# Patient Record
Sex: FEMALE | Race: ASIAN | NOT HISPANIC OR LATINO
[De-identification: names, ages, dates, MRNs, and addresses within clinical notes are randomized per-mention and may not be internally consistent; named-entity substitution may affect disease eponyms.]

---

## 2021-06-29 ENCOUNTER — NON-APPOINTMENT (OUTPATIENT)
Age: 66
End: 2021-06-29

## 2021-07-07 ENCOUNTER — APPOINTMENT (OUTPATIENT)
Dept: ORTHOPEDIC SURGERY | Facility: CLINIC | Age: 66
End: 2021-07-07
Payer: MEDICAID

## 2021-07-07 VITALS
HEART RATE: 68 BPM | BODY MASS INDEX: 29.07 KG/M2 | SYSTOLIC BLOOD PRESSURE: 129 MMHG | OXYGEN SATURATION: 98 % | WEIGHT: 154 LBS | HEIGHT: 61 IN | DIASTOLIC BLOOD PRESSURE: 82 MMHG

## 2021-07-07 DIAGNOSIS — Z78.9 OTHER SPECIFIED HEALTH STATUS: ICD-10-CM

## 2021-07-07 DIAGNOSIS — Z87.39 PERSONAL HISTORY OF OTHER DISEASES OF THE MUSCULOSKELETAL SYSTEM AND CONNECTIVE TISSUE: ICD-10-CM

## 2021-07-07 DIAGNOSIS — Z86.79 PERSONAL HISTORY OF OTHER DISEASES OF THE CIRCULATORY SYSTEM: ICD-10-CM

## 2021-07-07 DIAGNOSIS — Z86.39 PERSONAL HISTORY OF OTHER ENDOCRINE, NUTRITIONAL AND METABOLIC DISEASE: ICD-10-CM

## 2021-07-07 PROBLEM — Z00.00 ENCOUNTER FOR PREVENTIVE HEALTH EXAMINATION: Status: ACTIVE | Noted: 2021-07-07

## 2021-07-07 PROCEDURE — 72070 X-RAY EXAM THORAC SPINE 2VWS: CPT

## 2021-07-07 PROCEDURE — 99203 OFFICE O/P NEW LOW 30 MIN: CPT

## 2021-07-07 PROCEDURE — 72110 X-RAY EXAM L-2 SPINE 4/>VWS: CPT

## 2021-07-07 RX ORDER — ATORVASTATIN CALCIUM 10 MG/1
10 TABLET, FILM COATED ORAL
Refills: 0 | Status: ACTIVE | COMMUNITY

## 2021-07-07 NOTE — ASSESSMENT
[FreeTextEntry1] : I had a lengthy discussion with the patient in regards to their treatment plan and diagnosis.  They do have objective weakness findings on my exam.  Their symptoms have persisted despite the conservative management they have attempted thus far.  As a result I would like to proceed with a lumbar MRI.  In tandem with this they should begin physical therapy/home therapy program.  The patient can take Tylenol/NSAIDs as needed for pain control if medically able to.  I will have the patient follow-up in 3 to 4 weeks for repeat clinical evaluation.  I encouraged them to follow-up sooner if their symptoms worsen or change in any way.

## 2021-07-07 NOTE — PHYSICAL EXAM
[de-identified] : Lumbar Physical Exam\par \par Gait - Normal\par \par Station - Normal\par \par Sagittal balance - Normal\par \par Compensatory mechanism? - None\par \par Heel walk - Normal\par \par Toe walk - Normal\par \par Reflexes\par Patellar - normal\par Gastroc - normal\par Clonus - No\par \par Hip Exam - Normal\par \par Straight leg raise - none\par \par Pulses - 2+ dp/pt\par \par Range of motion - normal\par \par Sensation \par Sensation intact to light touch in L1, L2, L3, L4, L5 and S1 dermatomes bilaterally\par \par Motor\par 	IP	Quad	HS	TA	Gastroc	EHL\par Right	5/5	5/5	5/5	5/5	5/5	5/5\par Left	4/5	4/5	5/5	5/5	5/5	5/5 [de-identified] : Lumbar radiographs\par Degenerative scoliosis noted\par No obvious instability on flexion-extension radiographs\par Listhesis noted on lateral film\par \par Thoracic radiographs\par Thoracic spondylosis noted\par Kyphosis within normal limits

## 2021-07-07 NOTE — HISTORY OF PRESENT ILLNESS
[de-identified] : This is a 65-year-old female here today for evaluation of her back and lower extremity symptoms.  She has been dealing with this for the past year.  Over the past several months it has acutely worsened.  She has pain that radiates down her left lateral thigh.  She also has some right-sided leg pain but the left side is worse than her right.  She has tried extensive conservative management including NSAIDs and physical therapy but unfortunately her symptoms have continued.  She denies any bowel bladder issues.  She denies any saddle anesthesia.

## 2021-07-24 ENCOUNTER — APPOINTMENT (OUTPATIENT)
Dept: MRI IMAGING | Facility: CLINIC | Age: 66
End: 2021-07-24
Payer: MEDICAID

## 2021-07-24 ENCOUNTER — OUTPATIENT (OUTPATIENT)
Dept: OUTPATIENT SERVICES | Facility: HOSPITAL | Age: 66
LOS: 1 days | End: 2021-07-24
Payer: MEDICAID

## 2021-07-24 DIAGNOSIS — M54.5 LOW BACK PAIN: ICD-10-CM

## 2021-07-24 PROCEDURE — 72148 MRI LUMBAR SPINE W/O DYE: CPT | Mod: 26

## 2021-07-24 PROCEDURE — 72148 MRI LUMBAR SPINE W/O DYE: CPT

## 2021-07-28 ENCOUNTER — APPOINTMENT (OUTPATIENT)
Dept: ORTHOPEDIC SURGERY | Facility: CLINIC | Age: 66
End: 2021-07-28

## 2021-08-07 ENCOUNTER — TRANSCRIPTION ENCOUNTER (OUTPATIENT)
Age: 66
End: 2021-08-07

## 2021-08-16 ENCOUNTER — APPOINTMENT (OUTPATIENT)
Dept: ORTHOPEDIC SURGERY | Facility: CLINIC | Age: 66
End: 2021-08-16

## 2021-09-20 ENCOUNTER — APPOINTMENT (OUTPATIENT)
Dept: ORTHOPEDIC SURGERY | Facility: CLINIC | Age: 66
End: 2021-09-20
Payer: MEDICAID

## 2021-09-20 VITALS
DIASTOLIC BLOOD PRESSURE: 82 MMHG | SYSTOLIC BLOOD PRESSURE: 130 MMHG | WEIGHT: 154 LBS | BODY MASS INDEX: 29.07 KG/M2 | HEART RATE: 64 BPM | HEIGHT: 61 IN | OXYGEN SATURATION: 97 %

## 2021-09-20 DIAGNOSIS — M54.5 LOW BACK PAIN: ICD-10-CM

## 2021-09-20 PROCEDURE — 99214 OFFICE O/P EST MOD 30 MIN: CPT

## 2021-09-20 NOTE — HISTORY OF PRESENT ILLNESS
[de-identified] : Today the patient states she is doing a little better. She has some hip pain and some knee pain along with low back pain. She is taking meloxicam and tizanidine with relief. She is also doing her physical therapy which is helping her. She is able to walk 3 blocks before she has to sit down and rest. She denies any saddle anesthesia. She denies any bowel/bladder issues. \par \par \par 07/07/21\par This is a 65-year-old female here today for evaluation of her back and lower extremity symptoms.  She has been dealing with this for the past year.  Over the past several months it has acutely worsened.  She has pain that radiates down her left lateral thigh.  She also has some right-sided leg pain but the left side is worse than her right.  She has tried extensive conservative management including NSAIDs and physical therapy but unfortunately her symptoms have continued.  She denies any bowel bladder issues.  She denies any saddle anesthesia.

## 2021-09-20 NOTE — PHYSICAL EXAM
[de-identified] : Lumbar Physical Exam\par \par Gait - Normal\par \par Station - Normal\par \par Sagittal balance - Normal\par \par Compensatory mechanism? - None\par \par Heel walk - Normal\par \par Toe walk - Normal\par \par Reflexes\par Patellar - normal\par Gastroc - normal\par Clonus - No\par \par Hip Exam - Normal\par \par Straight leg raise - none\par \par Pulses - 2+ dp/pt\par \par Range of motion - normal\par \par Sensation \par Sensation intact to light touch in L1, L2, L3, L4, L5 and S1 dermatomes bilaterally\par \par Motor\par 	IP	Quad	HS	TA	Gastroc	EHL\par Right	5/5	5/5	5/5	5/5	5/5	5/5\par Left	5/5	5/5	5/5	5/5	5/5	5/5 [de-identified] : Lumbar radiographs\par Degenerative scoliosis noted\par No obvious instability on flexion-extension radiographs\par Listhesis noted on lateral film\par \par Thoracic radiographs\par Thoracic spondylosis noted\par Kyphosis within normal limits\par \par Lumbar MRI\par There is multilevel foraminal stenosis, worse at L2-L3\par Significant facet arthrosis noted\par \par

## 2021-09-20 NOTE — ASSESSMENT
[FreeTextEntry1] : And lengthy discussion with the patient regards to her treatment plan and diagnosis.  At this point we will continue with conservative management.  This will include pain management referral.  An order for this is been placed today.  I would also like her to continue with physical therapy.  She can take Tylenol ibuprofen as needed for pain relief.  I will have her follow-up in 8 weeks for repeat clinical evaluation.  I encouraged her to follow-up sooner if she has any worsening symptoms.

## 2021-11-22 ENCOUNTER — APPOINTMENT (OUTPATIENT)
Dept: ORTHOPEDIC SURGERY | Facility: CLINIC | Age: 66
End: 2021-11-22

## 2022-01-28 ENCOUNTER — INPATIENT (INPATIENT)
Facility: HOSPITAL | Age: 67
LOS: 18 days | Discharge: HOME | End: 2022-02-16
Attending: PSYCHIATRY & NEUROLOGY | Admitting: PSYCHIATRY & NEUROLOGY
Payer: MEDICAID

## 2022-01-28 VITALS
HEART RATE: 112 BPM | OXYGEN SATURATION: 100 % | DIASTOLIC BLOOD PRESSURE: 79 MMHG | SYSTOLIC BLOOD PRESSURE: 152 MMHG | RESPIRATION RATE: 18 BRPM | TEMPERATURE: 98 F

## 2022-01-28 LAB — TOXICOLOGY SCREEN, DRUGS OF ABUSE, SERUM RESULT: SIGNIFICANT CHANGE UP

## 2022-01-28 PROCEDURE — 99285 EMERGENCY DEPT VISIT HI MDM: CPT | Mod: 25

## 2022-01-28 PROCEDURE — 93010 ELECTROCARDIOGRAM REPORT: CPT

## 2022-01-28 RX ORDER — ACETAMINOPHEN 500 MG
650 TABLET ORAL ONCE
Refills: 0 | Status: COMPLETED | OUTPATIENT
Start: 2022-01-28 | End: 2022-01-28

## 2022-01-28 NOTE — ED ADULT NURSE NOTE - CHIEF COMPLAINT QUOTE
ams    as per daughter, kylee ([phone]), pt has had ams for several weeks.. becomes agitated only at night, fighting with family members, having visual hallucinations.. seeing dead family members.. thinks they are stealing from her.  hx of htn and hi chol.  pt aa&ox3.. became irritated when asked the date.  calm at this time.

## 2022-01-28 NOTE — ED ADULT TRIAGE NOTE - ADDITIONAL SAFETY/BANDS...
Spoke with pt. Notified pt she is not due for annual until 6/20/18. Notified pt that our providers do not go to the [facility] at this time. Scheduled pt for annual with repeat pap smear at the [facility] location with Dr. MINE Mills 6/20/18 at 7:30. Pt verbalized understanding.    Additional Safety/Bands:

## 2022-01-28 NOTE — ED ADULT TRIAGE NOTE - CHIEF COMPLAINT QUOTE
ams    as per daughter, kylee ([phone]), pt has had ams for several weeks.. becomes agitated only at night, fighting with family members, having visual hallucinations.. seeing dead family members.. thinks they are stealing from her.  hx of htn and hi chol.  pt aa&ox3.. became irritated when asked the date.  calm at this time.
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
## 2022-01-29 DIAGNOSIS — R46.89 OTHER SYMPTOMS AND SIGNS INVOLVING APPEARANCE AND BEHAVIOR: ICD-10-CM

## 2022-01-29 DIAGNOSIS — F29 UNSPECIFIED PSYCHOSIS NOT DUE TO A SUBSTANCE OR KNOWN PHYSIOLOGICAL CONDITION: ICD-10-CM

## 2022-01-29 LAB
ALBUMIN SERPL ELPH-MCNC: 4.3 G/DL — SIGNIFICANT CHANGE UP (ref 3.3–5)
ALP SERPL-CCNC: 107 U/L — SIGNIFICANT CHANGE UP (ref 40–120)
ALT FLD-CCNC: 17 U/L — SIGNIFICANT CHANGE UP (ref 4–33)
AMPHET UR-MCNC: NEGATIVE — SIGNIFICANT CHANGE UP
ANION GAP SERPL CALC-SCNC: 10 MMOL/L — SIGNIFICANT CHANGE UP (ref 7–14)
APAP SERPL-MCNC: <10 UG/ML — LOW (ref 15–25)
APPEARANCE UR: CLEAR — SIGNIFICANT CHANGE UP
AST SERPL-CCNC: 24 U/L — SIGNIFICANT CHANGE UP (ref 4–32)
BACTERIA # UR AUTO: NEGATIVE — SIGNIFICANT CHANGE UP
BARBITURATES UR SCN-MCNC: NEGATIVE — SIGNIFICANT CHANGE UP
BASOPHILS # BLD AUTO: 0.04 K/UL — SIGNIFICANT CHANGE UP (ref 0–0.2)
BASOPHILS NFR BLD AUTO: 0.5 % — SIGNIFICANT CHANGE UP (ref 0–2)
BENZODIAZ UR-MCNC: NEGATIVE — SIGNIFICANT CHANGE UP
BILIRUB SERPL-MCNC: 0.3 MG/DL — SIGNIFICANT CHANGE UP (ref 0.2–1.2)
BILIRUB UR-MCNC: NEGATIVE — SIGNIFICANT CHANGE UP
BUN SERPL-MCNC: 12 MG/DL — SIGNIFICANT CHANGE UP (ref 7–23)
CALCIUM SERPL-MCNC: 10.1 MG/DL — SIGNIFICANT CHANGE UP (ref 8.4–10.5)
CHLORIDE SERPL-SCNC: 103 MMOL/L — SIGNIFICANT CHANGE UP (ref 98–107)
CO2 SERPL-SCNC: 27 MMOL/L — SIGNIFICANT CHANGE UP (ref 22–31)
COCAINE METAB.OTHER UR-MCNC: NEGATIVE — SIGNIFICANT CHANGE UP
COLOR SPEC: COLORLESS — SIGNIFICANT CHANGE UP
CREAT SERPL-MCNC: 1.05 MG/DL — SIGNIFICANT CHANGE UP (ref 0.5–1.3)
CREATININE URINE RESULT, DAU: 20 MG/DL — SIGNIFICANT CHANGE UP
DIFF PNL FLD: NEGATIVE — SIGNIFICANT CHANGE UP
EOSINOPHIL # BLD AUTO: 0.09 K/UL — SIGNIFICANT CHANGE UP (ref 0–0.5)
EOSINOPHIL NFR BLD AUTO: 1.2 % — SIGNIFICANT CHANGE UP (ref 0–6)
EPI CELLS # UR: 1 /HPF — SIGNIFICANT CHANGE UP (ref 0–5)
ETHANOL SERPL-MCNC: <10 MG/DL — SIGNIFICANT CHANGE UP
GLUCOSE SERPL-MCNC: 138 MG/DL — HIGH (ref 70–99)
GLUCOSE UR QL: NEGATIVE — SIGNIFICANT CHANGE UP
HCT VFR BLD CALC: 38.8 % — SIGNIFICANT CHANGE UP (ref 34.5–45)
HGB BLD-MCNC: 12.8 G/DL — SIGNIFICANT CHANGE UP (ref 11.5–15.5)
IANC: 5.2 K/UL — SIGNIFICANT CHANGE UP (ref 1.5–8.5)
IMM GRANULOCYTES NFR BLD AUTO: 0.3 % — SIGNIFICANT CHANGE UP (ref 0–1.5)
KETONES UR-MCNC: NEGATIVE — SIGNIFICANT CHANGE UP
LEUKOCYTE ESTERASE UR-ACNC: NEGATIVE — SIGNIFICANT CHANGE UP
LYMPHOCYTES # BLD AUTO: 1.76 K/UL — SIGNIFICANT CHANGE UP (ref 1–3.3)
LYMPHOCYTES # BLD AUTO: 23.2 % — SIGNIFICANT CHANGE UP (ref 13–44)
MCHC RBC-ENTMCNC: 28.6 PG — SIGNIFICANT CHANGE UP (ref 27–34)
MCHC RBC-ENTMCNC: 33 GM/DL — SIGNIFICANT CHANGE UP (ref 32–36)
MCV RBC AUTO: 86.8 FL — SIGNIFICANT CHANGE UP (ref 80–100)
METHADONE UR-MCNC: NEGATIVE — SIGNIFICANT CHANGE UP
MONOCYTES # BLD AUTO: 0.46 K/UL — SIGNIFICANT CHANGE UP (ref 0–0.9)
MONOCYTES NFR BLD AUTO: 6.1 % — SIGNIFICANT CHANGE UP (ref 2–14)
NEUTROPHILS # BLD AUTO: 5.2 K/UL — SIGNIFICANT CHANGE UP (ref 1.8–7.4)
NEUTROPHILS NFR BLD AUTO: 68.7 % — SIGNIFICANT CHANGE UP (ref 43–77)
NITRITE UR-MCNC: NEGATIVE — SIGNIFICANT CHANGE UP
NRBC # BLD: 0 /100 WBCS — SIGNIFICANT CHANGE UP
NRBC # FLD: 0 K/UL — SIGNIFICANT CHANGE UP
OPIATES UR-MCNC: NEGATIVE — SIGNIFICANT CHANGE UP
OXYCODONE UR-MCNC: NEGATIVE — SIGNIFICANT CHANGE UP
PCP SPEC-MCNC: SIGNIFICANT CHANGE UP
PCP UR-MCNC: NEGATIVE — SIGNIFICANT CHANGE UP
PH UR: 6.5 — SIGNIFICANT CHANGE UP (ref 5–8)
PLATELET # BLD AUTO: 223 K/UL — SIGNIFICANT CHANGE UP (ref 150–400)
POTASSIUM SERPL-MCNC: 3.3 MMOL/L — LOW (ref 3.5–5.3)
POTASSIUM SERPL-SCNC: 3.3 MMOL/L — LOW (ref 3.5–5.3)
PROT SERPL-MCNC: 7.2 G/DL — SIGNIFICANT CHANGE UP (ref 6–8.3)
PROT UR-MCNC: NEGATIVE — SIGNIFICANT CHANGE UP
RBC # BLD: 4.47 M/UL — SIGNIFICANT CHANGE UP (ref 3.8–5.2)
RBC # FLD: 12.6 % — SIGNIFICANT CHANGE UP (ref 10.3–14.5)
RBC CASTS # UR COMP ASSIST: 1 /HPF — SIGNIFICANT CHANGE UP (ref 0–4)
SALICYLATES SERPL-MCNC: <0.3 MG/DL — LOW (ref 15–30)
SARS-COV-2 RNA SPEC QL NAA+PROBE: SIGNIFICANT CHANGE UP
SODIUM SERPL-SCNC: 140 MMOL/L — SIGNIFICANT CHANGE UP (ref 135–145)
SP GR SPEC: 1 — SIGNIFICANT CHANGE UP (ref 1–1.05)
THC UR QL: NEGATIVE — SIGNIFICANT CHANGE UP
TSH SERPL-MCNC: 4.56 UIU/ML — HIGH (ref 0.27–4.2)
UROBILINOGEN FLD QL: SIGNIFICANT CHANGE UP
WBC # BLD: 7.57 K/UL — SIGNIFICANT CHANGE UP (ref 3.8–10.5)
WBC # FLD AUTO: 7.57 K/UL — SIGNIFICANT CHANGE UP (ref 3.8–10.5)
WBC UR QL: 1 /HPF — SIGNIFICANT CHANGE UP (ref 0–5)

## 2022-01-29 PROCEDURE — 70450 CT HEAD/BRAIN W/O DYE: CPT | Mod: 26,MA

## 2022-01-29 PROCEDURE — 71046 X-RAY EXAM CHEST 2 VIEWS: CPT | Mod: 26

## 2022-01-29 PROCEDURE — 99222 1ST HOSP IP/OBS MODERATE 55: CPT

## 2022-01-29 RX ORDER — RISPERIDONE 4 MG/1
0.5 TABLET ORAL AT BEDTIME
Refills: 0 | Status: DISCONTINUED | OUTPATIENT
Start: 2022-01-29 | End: 2022-02-02

## 2022-01-29 RX ORDER — LISINOPRIL 2.5 MG/1
5 TABLET ORAL AT BEDTIME
Refills: 0 | Status: DISCONTINUED | OUTPATIENT
Start: 2022-01-29 | End: 2022-01-29

## 2022-01-29 RX ORDER — ATORVASTATIN CALCIUM 80 MG/1
10 TABLET, FILM COATED ORAL AT BEDTIME
Refills: 0 | Status: DISCONTINUED | OUTPATIENT
Start: 2022-01-29 | End: 2022-02-16

## 2022-01-29 RX ORDER — LISINOPRIL 2.5 MG/1
5 TABLET ORAL AT BEDTIME
Refills: 0 | Status: DISCONTINUED | OUTPATIENT
Start: 2022-01-29 | End: 2022-02-07

## 2022-01-29 RX ORDER — HALOPERIDOL DECANOATE 100 MG/ML
2.5 INJECTION INTRAMUSCULAR EVERY 6 HOURS
Refills: 0 | Status: DISCONTINUED | OUTPATIENT
Start: 2022-01-29 | End: 2022-02-16

## 2022-01-29 RX ORDER — HALOPERIDOL DECANOATE 100 MG/ML
2.5 INJECTION INTRAMUSCULAR ONCE
Refills: 0 | Status: DISCONTINUED | OUTPATIENT
Start: 2022-01-29 | End: 2022-02-16

## 2022-01-29 RX ORDER — ACETAMINOPHEN 500 MG
650 TABLET ORAL EVERY 6 HOURS
Refills: 0 | Status: DISCONTINUED | OUTPATIENT
Start: 2022-01-29 | End: 2022-02-16

## 2022-01-29 RX ORDER — LISINOPRIL 2.5 MG/1
5 TABLET ORAL DAILY
Refills: 0 | Status: DISCONTINUED | OUTPATIENT
Start: 2022-01-29 | End: 2022-01-29

## 2022-01-29 RX ADMIN — Medication 650 MILLIGRAM(S): at 00:19

## 2022-01-29 RX ADMIN — ATORVASTATIN CALCIUM 10 MILLIGRAM(S): 80 TABLET, FILM COATED ORAL at 21:45

## 2022-01-29 RX ADMIN — LISINOPRIL 5 MILLIGRAM(S): 2.5 TABLET ORAL at 21:46
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
## 2022-01-29 NOTE — ED PROVIDER NOTE - ATTENDING CONTRIBUTION TO CARE
Attending Attestation: Dr. Calixto  I have personally performed a history and physical examination of the patient and discussed management with the resident as well as the patient.  I reviewed the resident's note and agree with the documented findings and plan of care.  I have authored and modified critical sections of the Provider Note, including but not limited to HPI, Physical Exam and MDM. 65 yo F w/ history of HTN and HLD coming in to the ED at behest of family members for AMS and aggression; pt denies all. Family reports delusions and paranoia, worsening.  Unclear etiology, suspect primary psych either related to early onset dementia vs depression vs similar. Will evaluate for any metabolic vs infectious vs structural causes of delirium and if negative, discuss w/ psych

## 2022-01-29 NOTE — BH INPATIENT PSYCHIATRY ASSESSMENT NOTE - CURRENT MEDICATION
MEDICATIONS  (STANDING):  atorvastatin 10 milliGRAM(s) Oral at bedtime  lisinopril 5 milliGRAM(s) Oral at bedtime  risperiDONE   Tablet 0.5 milliGRAM(s) Oral at bedtime    MEDICATIONS  (PRN):  acetaminophen     Tablet .. 650 milliGRAM(s) Oral every 6 hours PRN Mild Pain (1 - 3), Moderate Pain (4 - 6)  haloperidol     Tablet 2.5 milliGRAM(s) Oral every 6 hours PRN agitation  haloperidol    Injectable 2.5 milliGRAM(s) IntraMuscular once PRN agitation

## 2022-01-29 NOTE — BH INPATIENT PSYCHIATRY ASSESSMENT NOTE - NSBHASSESSSUMMFT_PSY_ALL_CORE
66F no prior psych hx, admitted with paranoia and agitation/aggression.  Unclear if late onset primary psychosis, medically related, neurocognitive disorder, or multifactorial.  Continue admission.  No CO required.  Continue meds as ordered.  Collateral by primary team.

## 2022-01-29 NOTE — ED PROVIDER NOTE - PHYSICAL EXAMINATION
GENERAL: well appearing in no acute distress, non-toxic appearing  HEAD: normocephalic, atraumatic  HENT: airway intact, neck supple, no c spine tenderness  EYES: normal conjunctiva, EOMI, PERRL  CARDIAC: regular rate and rhythm, normal S1S2, no appreciable murmurs, 2+ pulses in UE/LE b/l  PULM: normal breath sounds, clear to ascultation bilaterally, no rales, rhonchi, wheezing  GI: abdomen nondistended, soft, nontender, no guarding, rebound tenderness  NEURO: no focal motor or sensory deficits, CN2-12 intact, normal speech, normal gait, AAOx3  MSK: right paraspinal ttp + right trapezius ttp  SKIN: well-perfused, extremities warm, no visible rashes  PSYCH: appropriate mood and affect, no flight of ideas or confabulations
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
## 2022-01-29 NOTE — BH INPATIENT PSYCHIATRY ASSESSMENT NOTE - MSE UNSTRUCTURED FT
Dressed appropriately.  Good hygiene and grooming.  Calm and cooperative.  No psychomotor slowing or activation noted.  No abnormal movements noted.  Fairly well related, good eye contact.  Speech regular rate, accented, normal prosody.  Mood is "ok," affect pleasant, full range, reactive.  Overinclusive TP, fair associations.  TC: Suspected paranoid delusions.  Denies SIIP or HIIP.  Insight fair, judgment fair on interview.  Pt requires frequent repetition of questions, unclear if this is attention issue.  Language fluent, gait intact.

## 2022-01-29 NOTE — ED BEHAVIORAL HEALTH ASSESSMENT NOTE - DESCRIPTION
Pt calm and cooperative, with high BP, vitals otherwise stable, no need for emergency medication    ICU Vital Signs Last 24 Hrs  T(C): 36.5 (28 Jan 2022 23:26), Max: 36.5 (28 Jan 2022 23:26)  T(F): 97.7 (28 Jan 2022 23:26), Max: 97.7 (28 Jan 2022 23:26)  HR: 98 (28 Jan 2022 23:26) (98 - 112)  BP: 160/99 (28 Jan 2022 23:26) (152/79 - 160/99)  BP(mean): --  ABP: --  ABP(mean): --  RR: 18 (28 Jan 2022 23:26) (18 - 18)  SpO2: 99% (28 Jan 2022 23:26) (99% - 100%) used to work at a nursing home, enjoys walking, cooking, lives with family hld, htn
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
## 2022-01-29 NOTE — ED PROVIDER NOTE - CLINICAL SUMMARY MEDICAL DECISION MAKING FREE TEXT BOX
67yo F w/ history of HTN and HLD coming in to the ED at behest of family members for AMS and aggression; will evaluate for any metabolic vs infectious vs structural causes of delirium and if negative, discuss w/ psych 65 yo F w/ history of HTN and HLD coming in to the ED at behest of family members for AMS and aggression; pt denies all. Family reports delusions and paranoia, worsening.  Unclear etiology, suspect primary psych either related to early onset dementia vs depression vs similar. Will evaluate for any metabolic vs infectious vs structural causes of delirium and if negative, discuss w/ psych

## 2022-01-29 NOTE — BH INPATIENT PSYCHIATRY ASSESSMENT NOTE - NSBHCHARTREVIEWVS_PSY_A_CORE FT
Vital Signs Last 24 Hrs  T(C): 36.7 (01-29-22 @ 08:46), Max: 36.9 (01-29-22 @ 05:28)  T(F): 98 (01-29-22 @ 08:46), Max: 98.4 (01-29-22 @ 05:28)  HR: 59 (01-29-22 @ 07:37) (59 - 112)  BP: 130/78 (01-29-22 @ 07:37) (130/78 - 160/99)  BP(mean): --  RR: 18 (01-29-22 @ 08:46) (17 - 18)  SpO2: 100% (01-29-22 @ 07:37) (99% - 100%)    Orthostatic VS  01-29-22 @ 08:46  Lying BP: --/-- HR: --  Sitting BP: 148/86 HR: 81  Standing BP: 149/87 HR: 84  Site: --  Mode: --
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
## 2022-01-29 NOTE — ED BEHAVIORAL HEALTH ASSESSMENT NOTE - NSSUICPROTFACT_PSY_ALL_CORE
Responsibility to children, family, or others/Identifies reasons for living/Supportive social network of family or friends/Yarsanism beliefs

## 2022-01-29 NOTE — ED BEHAVIORAL HEALTH ASSESSMENT NOTE - DETAILS
more agitated towards family as described in collateral pt denies AV noe back pain daughter kylee noe

## 2022-01-29 NOTE — BH INPATIENT PSYCHIATRY ASSESSMENT NOTE - NSBHMETABOLIC_PSY_ALL_CORE_FT
BMI: BMI (kg/m2): 28.4 (01-29-22 @ 08:46)  HbA1c:   Glucose: POCT Blood Glucose.: 143 mg/dL (01-28-22 @ 22:34)    BP: 130/78 (01-29-22 @ 07:37) (130/78 - 160/99)  Lipid Panel:

## 2022-01-30 LAB
COVID-19 SPIKE DOMAIN AB INTERP: POSITIVE
COVID-19 SPIKE DOMAIN ANTIBODY RESULT: >250 U/ML — HIGH
CULTURE RESULTS: SIGNIFICANT CHANGE UP
SARS-COV-2 IGG+IGM SERPL QL IA: >250 U/ML — HIGH
SARS-COV-2 IGG+IGM SERPL QL IA: POSITIVE
SPECIMEN SOURCE: SIGNIFICANT CHANGE UP

## 2022-01-30 PROCEDURE — 99231 SBSQ HOSP IP/OBS SF/LOW 25: CPT

## 2022-01-30 RX ADMIN — RISPERIDONE 0.5 MILLIGRAM(S): 4 TABLET ORAL at 21:36

## 2022-01-30 RX ADMIN — Medication 650 MILLIGRAM(S): at 22:46

## 2022-01-30 RX ADMIN — LISINOPRIL 5 MILLIGRAM(S): 2.5 TABLET ORAL at 21:37

## 2022-01-30 RX ADMIN — ATORVASTATIN CALCIUM 10 MILLIGRAM(S): 80 TABLET, FILM COATED ORAL at 21:36

## 2022-01-30 NOTE — BH INPATIENT PSYCHIATRY PROGRESS NOTE - NSBHCHARTREVIEWVS_PSY_A_CORE FT
Vital Signs Last 24 Hrs  T(C): 36.6 (01-30-22 @ 08:47), Max: 36.7 (01-29-22 @ 18:20)  T(F): 97.9 (01-30-22 @ 08:47), Max: 98.1 (01-29-22 @ 18:20)  HR: --  BP: --  BP(mean): --  RR: --  SpO2: --    Orthostatic VS  01-30-22 @ 08:47  Lying BP: --/-- HR: --  Sitting BP: 134/84 HR: 70  Standing BP: --/-- HR: --  Site: --  Mode: --  Orthostatic VS  01-29-22 @ 20:51  Lying BP: --/-- HR: --  Sitting BP: 122/55 HR: 75  Standing BP: --/-- HR: --  Site: --  Mode: --  Orthostatic VS  01-29-22 @ 08:46  Lying BP: --/-- HR: --  Sitting BP: 148/86 HR: 81  Standing BP: 149/87 HR: 84  Site: --  Mode: --

## 2022-01-30 NOTE — PSYCHIATRIC REHAB INITIAL EVALUATION - NSBHPRRECOMMEND_PSY_ALL_CORE
Writer met with patient in order to orient patient to unit, provide patient with a unit schedule, and  introduce patient to psychiatric rehabilitation staff and department functions. Writer discussed current protocol in response to COVID-19; writer reviewed the importance of daily hygiene, hand-washing, and the function and importance of the mask mandate and appropriate social distancing. Patient was verbal, polite and cooperative. Patient perseverated about family stealing from patient and that they are jealous of patient for working for many years. Patient stated family made patient coming to hospital because ‘they think I’m crazy, but I am not”. Per chart, patient was BIB family due to worsening agitation/paranoia. Patient has no PPH. Patient has PMH of HTN/HLD. Patient has no known history of substance misuse. Patient denies AH/VH/SI/SIB/HI. Writer and patient were able to collaborate on a psychiatric rehabilitation goal. Psych rehab staff will continue to engage patient daily in order to build therapeutic rapport.

## 2022-01-30 NOTE — BH INPATIENT PSYCHIATRY PROGRESS NOTE - NSBHFUPINTERVALHXFT_PSY_A_CORE
chart reviewed. case discussed with nursing staff. Over this interval: pt refused Risperdal PO. Patient does not believe she has a mental illness. States that she lives with family and there is mutual conflict and that she is not always the aggressor. Tells me she doesn't wish to be on psychiatric meds because she is not "crazy." I encourage patient to work with primary team. She denies SI and HI. Denies AVH. reports fair sleep

## 2022-01-30 NOTE — BH INPATIENT PSYCHIATRY PROGRESS NOTE - NSBHASSESSSUMMFT_PSY_ALL_CORE
66F no prior psych hx, admitted with paranoia and agitation/aggression.  Unclear if late onset primary psychosis, medically related, neurocognitive disorder, or multifactorial.      Over this interval, patient refused risperdal -- states she does not need psychiatric medications. MSE largely benign though with some preoccupation related to family members and endorsing ongoing verbal conflicts at home. continue meds as below and encourage patient to collaborate with team.     no CO indicated   c/w Risperdal 0.5mg qhs   PRN haldol for agitation   obtain collateral   coordinate with s/w

## 2022-01-30 NOTE — BH INPATIENT PSYCHIATRY PROGRESS NOTE - MSE UNSTRUCTURED FT
Dressed appropriately.  Good hygiene and grooming.  Calm and cooperative.  No psychomotor slowing or activation noted.  No abnormal movements noted.  Fairly well related, good eye contact.  Speech regular rate, accented, normal prosody.  Mood is "fine" affect pleasant, full range, reactive.  Overinclusive TP, fair associations.  TC: Suspected paranoid delusions.  Denies SIIP or HIIP.  Insight fair, judgment fair on interview. attention appropriate.  Language fluent, gait intact.

## 2022-01-31 LAB — SARS-COV-2 RNA SPEC QL NAA+PROBE: SIGNIFICANT CHANGE UP

## 2022-01-31 PROCEDURE — 99232 SBSQ HOSP IP/OBS MODERATE 35: CPT

## 2022-01-31 RX ORDER — CELECOXIB 200 MG/1
200 CAPSULE ORAL
Refills: 0 | Status: DISCONTINUED | OUTPATIENT
Start: 2022-01-31 | End: 2022-02-16

## 2022-01-31 RX ADMIN — ATORVASTATIN CALCIUM 10 MILLIGRAM(S): 80 TABLET, FILM COATED ORAL at 21:41

## 2022-01-31 RX ADMIN — Medication 650 MILLIGRAM(S): at 00:38

## 2022-01-31 RX ADMIN — LISINOPRIL 5 MILLIGRAM(S): 2.5 TABLET ORAL at 21:41

## 2022-01-31 RX ADMIN — RISPERIDONE 0.5 MILLIGRAM(S): 4 TABLET ORAL at 21:42

## 2022-01-31 NOTE — BH INPATIENT PSYCHIATRY PROGRESS NOTE - NSBHMETABOLIC_PSY_ALL_CORE_FT
BMI: BMI (kg/m2): 28.4 (01-29-22 @ 08:46)  HbA1c:   Glucose: POCT Blood Glucose.: 143 mg/dL (01-28-22 @ 22:34)    BP: 138/84 (01-31-22 @ 08:14) (130/78 - 160/99)  Lipid Panel:
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
## 2022-01-31 NOTE — BH SOCIAL WORK INITIAL PSYCHOSOCIAL EVALUATION - NSBHHOUSECOMMENTFT_PSY_ALL_CORE
Pt. currently resides in a private home with her  and two adult daughters. At this time, Pt. is able to return home.

## 2022-01-31 NOTE — ED BEHAVIORAL HEALTH NOTE - BEHAVIORAL HEALTH NOTE
Worker spoke to Sangeeta HONG at [facility] #[phone] who provided pending auth# [id number] from dates 1/29/22-2/1/22. Ref # [id number]. Team has been made aware. No additional social work interventions needed at this time.
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
## 2022-01-31 NOTE — BH INPATIENT PSYCHIATRY PROGRESS NOTE - CURRENT MEDICATION
MEDICATIONS  (STANDING):  atorvastatin 10 milliGRAM(s) Oral at bedtime  lisinopril 5 milliGRAM(s) Oral at bedtime  risperiDONE   Tablet 0.5 milliGRAM(s) Oral at bedtime    MEDICATIONS  (PRN):  acetaminophen     Tablet .. 650 milliGRAM(s) Oral every 6 hours PRN Mild Pain (1 - 3), Moderate Pain (4 - 6)  celecoxib 200 milliGRAM(s) Oral two times a day PRN Pain  haloperidol     Tablet 2.5 milliGRAM(s) Oral every 6 hours PRN agitation  haloperidol    Injectable 2.5 milliGRAM(s) IntraMuscular once PRN agitation

## 2022-01-31 NOTE — BH INPATIENT PSYCHIATRY PROGRESS NOTE - MSE UNSTRUCTURED FT
Dressed appropriately.  Good hygiene and grooming. Superficially cooperative.  No psychomotor slowing or activation noted.  No abnormal movements noted.  Fairly well related, good eye contact. Speech regular rate, accented, normal prosody.  Mood is "okay" affect labile, full range, reactive. Overinclusive TP, fair associations.  TC: perseveration, paranoid delusions of people stealing and damaging her things at home.  Denies SIIP or HIIP.  Insight fair, judgment fair on interview. attention appropriate.  Language fluent, gait intact.

## 2022-01-31 NOTE — BH INPATIENT PSYCHIATRY PROGRESS NOTE - NSBHCHARTREVIEWVS_PSY_A_CORE FT
Vital Signs Last 24 Hrs  T(C): 36.4 (01-31-22 @ 08:14), Max: 36.8 (01-30-22 @ 19:00)  T(F): 97.5 (01-31-22 @ 08:14), Max: 98.2 (01-30-22 @ 19:00)  HR: 97 (01-31-22 @ 08:14) (97 - 97)  BP: 138/84 (01-31-22 @ 08:14) (138/84 - 138/84)  BP(mean): --  RR: --  SpO2: --    Orthostatic VS  01-30-22 @ 20:38  Lying BP: --/-- HR: --  Sitting BP: 151/84 HR: 101  Standing BP: 146/85 HR: 79  Site: upper left arm  Mode: electronic  Orthostatic VS  01-30-22 @ 08:47  Lying BP: --/-- HR: --  Sitting BP: 134/84 HR: 70  Standing BP: --/-- HR: --  Site: --  Mode: --  Orthostatic VS  01-29-22 @ 20:51  Lying BP: --/-- HR: --  Sitting BP: 122/55 HR: 75  Standing BP: --/-- HR: --  Site: --  Mode: --
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
## 2022-02-01 PROCEDURE — 99231 SBSQ HOSP IP/OBS SF/LOW 25: CPT

## 2022-02-01 RX ADMIN — LISINOPRIL 5 MILLIGRAM(S): 2.5 TABLET ORAL at 21:35

## 2022-02-01 RX ADMIN — ATORVASTATIN CALCIUM 10 MILLIGRAM(S): 80 TABLET, FILM COATED ORAL at 21:34

## 2022-02-01 RX ADMIN — RISPERIDONE 0.5 MILLIGRAM(S): 4 TABLET ORAL at 21:34

## 2022-02-01 NOTE — BH INPATIENT PSYCHIATRY PROGRESS NOTE - MSE UNSTRUCTURED FT
Dressed appropriately.  Good hygiene and grooming. Superficially cooperative.  No psychomotor slowing or activation noted.  No abnormal movements noted.  Fairly well related, good eye contact. Speech regular rate, accented, normal prosody.  Mood is "good" affect more stable today, pleasant.  Less overinclusive TP today, fair associations.  TC: Much less spontaneous paranoia on interview.  No SIIP or HIIP.   Insight limited, judgment improved on interview.  Language fluent, gait intact.     MMSE:   Date 5/5, Location 5/5, Registration 3/3, Serial 7s 4/5, Delayed Recall 3/3, Naming 2/2, Repetition 0/1 (SPOKEN LANGUAGE BARRIER), Commands 3/3, Reading Comprehension 1/1, Writing 1/1, Pentagons 1/1.  Total 27/30 (or 27/29 if taking into account language barrier).

## 2022-02-01 NOTE — BH INPATIENT PSYCHIATRY PROGRESS NOTE - NSBHMETABOLIC_PSY_ALL_CORE_FT
BMI: BMI (kg/m2): 28.4 (01-29-22 @ 08:46)  HbA1c:   Glucose: POCT Blood Glucose.: 143 mg/dL (01-28-22 @ 22:34)    BP: 135/88 (02-01-22 @ 08:05) (135/88 - 138/84)  Lipid Panel:

## 2022-02-01 NOTE — BH INPATIENT PSYCHIATRY PROGRESS NOTE - NSBHCHARTREVIEWVS_PSY_A_CORE FT
Vital Signs Last 24 Hrs  T(C): 36.6 (02-01-22 @ 08:05), Max: 36.7 (01-31-22 @ 18:25)  T(F): 97.9 (02-01-22 @ 08:05), Max: 98.1 (01-31-22 @ 18:25)  HR: 74 (02-01-22 @ 08:05) (74 - 74)  BP: 135/88 (02-01-22 @ 08:05) (135/88 - 135/88)  BP(mean): --  RR: --  SpO2: --    Orthostatic VS  01-31-22 @ 20:28  Lying BP: --/-- HR: --  Sitting BP: 147/83 HR: 68  Standing BP: 150/76 HR: 75  Site: upper left arm  Mode: electronic  Orthostatic VS  01-30-22 @ 20:38  Lying BP: --/-- HR: --  Sitting BP: 151/84 HR: 101  Standing BP: 146/85 HR: 79  Site: upper left arm  Mode: electronic

## 2022-02-01 NOTE — BH INPATIENT PSYCHIATRY PROGRESS NOTE - NSBHASSESSSUMMFT_PSY_ALL_CORE
66F admitted with reported cognitive issues and psychosis.  MMSE 27/30 (or 27/29) however high premorbid functioning, may require MOCA for more subtle cognitive deficits.    Continue risperidone for now.

## 2022-02-01 NOTE — BH INPATIENT PSYCHIATRY PROGRESS NOTE - NSBHFUPINTERVALHXFT_PSY_A_CORE
No overnight events.   [id number].  Pt today states she is feeling ok.  States she only takes atorvastatin 4 days a week as advised by her PMD because of muscle cramps.  States she gets her meds at [facility] on [address]  States she also gets a medication for back pain.

## 2022-02-02 PROCEDURE — 99232 SBSQ HOSP IP/OBS MODERATE 35: CPT

## 2022-02-02 RX ORDER — RISPERIDONE 4 MG/1
1 TABLET ORAL AT BEDTIME
Refills: 0 | Status: DISCONTINUED | OUTPATIENT
Start: 2022-02-02 | End: 2022-02-04

## 2022-02-02 RX ADMIN — LISINOPRIL 5 MILLIGRAM(S): 2.5 TABLET ORAL at 21:53

## 2022-02-02 RX ADMIN — RISPERIDONE 1 MILLIGRAM(S): 4 TABLET ORAL at 21:54

## 2022-02-02 RX ADMIN — ATORVASTATIN CALCIUM 10 MILLIGRAM(S): 80 TABLET, FILM COATED ORAL at 21:53

## 2022-02-02 RX ADMIN — Medication 650 MILLIGRAM(S): at 11:52

## 2022-02-02 NOTE — BH INPATIENT PSYCHIATRY PROGRESS NOTE - NSBHASSESSSUMMFT_PSY_ALL_CORE
66F admitted with reported cognitive issues and psychosis.  MMSE 27/30 (or 27/29) however high premorbid functioning, may require MOCA for more subtle cognitive deficits.    Continue risperidone for now. 66F admitted with reported cognitive issues and psychosis.  MMSE 27/30 (or 27/29) however high premorbid functioning, may require MOCA for more subtle cognitive deficits. Patient's paranoia improving. She is less preoccupied with paranoia of people stealing her belongings.     Increase Risperidone 66F admitted with reported cognitive issues and psychosis.  MMSE 27/30 (or 27/29) however high premorbid functioning, may require MOCA for more subtle cognitive deficits.  Intensity of symptoms improving, though symptoms still remain.  Increase Risperidone.

## 2022-02-02 NOTE — BH INPATIENT PSYCHIATRY PROGRESS NOTE - MSE UNSTRUCTURED FT
Dressed appropriately.  Good hygiene and grooming. Superficially cooperative.  No psychomotor slowing or activation noted.  No abnormal movements noted.  Fairly well related, good eye contact. Speech regular rate, accented, normal prosody.  Mood is "good" affect more stable today, pleasant.  Less overinclusive TP today, fair associations.  TC: Much less spontaneous paranoia on interview.  No SIIP or HIIP.   Insight limited, judgment improved on interview.  Language fluent, gait intact.     MMSE:   Date 5/5, Location 5/5, Registration 3/3, Serial 7s 4/5, Delayed Recall 3/3, Naming 2/2, Repetition 0/1 (SPOKEN LANGUAGE BARRIER), Commands 3/3, Reading Comprehension 1/1, Writing 1/1, Pentagons 1/1.  Total 27/30 (or 27/29 if taking into account language barrier). Dressed appropriately.  Good hygiene and grooming. Superficially cooperative.  No psychomotor slowing or activation noted.  No abnormal movements noted.  Fairly well related, good eye contact. Speech regular rate, accented, normal prosody.  Mood is "good" affect more stable today, pleasant.  Less overinclusive TP today, fair associations.  TC: paranoia of people damaging and stealing her belongings at home still present but patient is less preoccupied.  No SIIP or HIIP.   Insight limited, judgment improved on interview.  Language fluent, gait intact.      Dressed appropriately.  Good hygiene and grooming.  Cooperative.  No psychomotor slowing or activation noted.  No abnormal movements noted.  Fairly well related, good eye contact. Speech regular rate, accented, normal prosody.  Mood is "good" affect more stable today, pleasant.  Less overinclusive TP today, fair associations.  TC: As per subjective, however this thought content appears less prominent.  No SIIP or HIIP.   Insight limited, judgment improved on interview.  Language fluent, gait intact.

## 2022-02-02 NOTE — BH INPATIENT PSYCHIATRY PROGRESS NOTE - CURRENT MEDICATION
MEDICATIONS  (STANDING):  atorvastatin 10 milliGRAM(s) Oral at bedtime  lisinopril 5 milliGRAM(s) Oral at bedtime  risperiDONE   Tablet 0.5 milliGRAM(s) Oral at bedtime    MEDICATIONS  (PRN):  acetaminophen     Tablet .. 650 milliGRAM(s) Oral every 6 hours PRN Mild Pain (1 - 3), Moderate Pain (4 - 6)  celecoxib 200 milliGRAM(s) Oral two times a day PRN Pain  haloperidol     Tablet 2.5 milliGRAM(s) Oral every 6 hours PRN agitation  haloperidol    Injectable 2.5 milliGRAM(s) IntraMuscular once PRN agitation   MEDICATIONS  (STANDING):  atorvastatin 10 milliGRAM(s) Oral at bedtime  lisinopril 5 milliGRAM(s) Oral at bedtime  risperiDONE   Tablet 1 milliGRAM(s) Oral at bedtime    MEDICATIONS  (PRN):  acetaminophen     Tablet .. 650 milliGRAM(s) Oral every 6 hours PRN Mild Pain (1 - 3), Moderate Pain (4 - 6)  celecoxib 200 milliGRAM(s) Oral two times a day PRN Pain  haloperidol     Tablet 2.5 milliGRAM(s) Oral every 6 hours PRN agitation  haloperidol    Injectable 2.5 milliGRAM(s) IntraMuscular once PRN agitation

## 2022-02-02 NOTE — BH INPATIENT PSYCHIATRY PROGRESS NOTE - NSBHFUPINTERVALHXFT_PSY_A_CORE
**Note Incomplete**   [id number].  Pt today states she is feeling ok. Continues to endorse thoughts that people are  **Note Incomplete**   [id number].  Pt today states she is feeling ok. Continues to endorse thoughts that people are damaging and stealing her belongings at home.   [id number].  Pt today states she is feeling ok. Continues to endorse thoughts that people are damaging and stealing her belongings at home.

## 2022-02-02 NOTE — BH INPATIENT PSYCHIATRY PROGRESS NOTE - NSBHCHARTREVIEWVS_PSY_A_CORE FT
Vital Signs Last 24 Hrs  T(C): 36.6 (02-02-22 @ 06:51), Max: 36.6 (02-02-22 @ 06:51)  T(F): 97.8 (02-02-22 @ 06:51), Max: 97.8 (02-02-22 @ 06:51)  HR: --  BP: --  BP(mean): --  RR: --  SpO2: --    Orthostatic VS  02-02-22 @ 06:51  Lying BP: --/-- HR: --  Sitting BP: 134/76 HR: 68  Standing BP: --/-- HR: --  Site: --  Mode: --  Orthostatic VS  02-01-22 @ 20:27  Lying BP: --/-- HR: --  Sitting BP: 130/86 HR: 98  Standing BP: 132/88 HR: 101  Site: --  Mode: --  Orthostatic VS  01-31-22 @ 20:28  Lying BP: --/-- HR: --  Sitting BP: 147/83 HR: 68  Standing BP: 150/76 HR: 75  Site: upper left arm  Mode: electronic   Vital Signs Last 24 Hrs  T(C): 36.1 (02-02-22 @ 19:13), Max: 36.6 (02-02-22 @ 06:51)  T(F): 97 (02-02-22 @ 19:13), Max: 97.8 (02-02-22 @ 06:51)  HR: --  BP: --  BP(mean): --  RR: --  SpO2: --    Orthostatic VS  02-02-22 @ 20:13  Lying BP: --/-- HR: --  Sitting BP: 143/83 HR: 84  Standing BP: 147/82 HR: 89  Site: --  Mode: --  Orthostatic VS  02-02-22 @ 06:51  Lying BP: --/-- HR: --  Sitting BP: 134/76 HR: 68  Standing BP: --/-- HR: --  Site: --  Mode: --  Orthostatic VS  02-01-22 @ 20:27  Lying BP: --/-- HR: --  Sitting BP: 130/86 HR: 98  Standing BP: 132/88 HR: 101  Site: --  Mode: --

## 2022-02-03 LAB
T3 SERPL-MCNC: 88 NG/DL — SIGNIFICANT CHANGE UP (ref 80–200)
T4 AB SER-ACNC: 6.15 UG/DL — SIGNIFICANT CHANGE UP (ref 5.1–13)

## 2022-02-03 PROCEDURE — 99231 SBSQ HOSP IP/OBS SF/LOW 25: CPT

## 2022-02-03 RX ADMIN — Medication 650 MILLIGRAM(S): at 18:10

## 2022-02-03 RX ADMIN — RISPERIDONE 1 MILLIGRAM(S): 4 TABLET ORAL at 20:24

## 2022-02-03 RX ADMIN — LISINOPRIL 5 MILLIGRAM(S): 2.5 TABLET ORAL at 20:23

## 2022-02-03 RX ADMIN — ATORVASTATIN CALCIUM 10 MILLIGRAM(S): 80 TABLET, FILM COATED ORAL at 20:23

## 2022-02-03 NOTE — BH INPATIENT PSYCHIATRY PROGRESS NOTE - NSBHMETABOLIC_PSY_ALL_CORE_FT
BMI: BMI (kg/m2): 28.4 (01-29-22 @ 08:46)  HbA1c:   Glucose: POCT Blood Glucose.: 143 mg/dL (01-28-22 @ 22:34)    BP: 135/88 (02-01-22 @ 08:05) (135/88 - 135/88)  Lipid Panel:

## 2022-02-03 NOTE — BH INPATIENT PSYCHIATRY PROGRESS NOTE - NSBHASSESSSUMMFT_PSY_ALL_CORE
66F admitted with reported cognitive issues and psychosis.  MMSE 27/30 (or 27/29) however high premorbid functioning, may require MOCA for more subtle cognitive deficits.  Intensity of symptoms improving, though symptoms still remain. Will continue Risperdal

## 2022-02-03 NOTE — BH INPATIENT PSYCHIATRY PROGRESS NOTE - NSBHCHARTREVIEWVS_PSY_A_CORE FT
Vital Signs Last 24 Hrs  T(C): 36.1 (02-02-22 @ 19:13), Max: 36.1 (02-02-22 @ 19:13)  T(F): 97 (02-02-22 @ 19:13), Max: 97 (02-02-22 @ 19:13)  HR: --  BP: --  BP(mean): --  RR: --  SpO2: --    Orthostatic VS  02-02-22 @ 20:13  Lying BP: --/-- HR: --  Sitting BP: 143/83 HR: 84  Standing BP: 147/82 HR: 89  Site: --  Mode: --  Orthostatic VS  02-02-22 @ 06:51  Lying BP: --/-- HR: --  Sitting BP: 134/76 HR: 68  Standing BP: --/-- HR: --  Site: --  Mode: --  Orthostatic VS  02-01-22 @ 20:27  Lying BP: --/-- HR: --  Sitting BP: 130/86 HR: 98  Standing BP: 132/88 HR: 101  Site: --  Mode: --

## 2022-02-03 NOTE — BH INPATIENT PSYCHIATRY PROGRESS NOTE - CURRENT MEDICATION
MEDICATIONS  (STANDING):  atorvastatin 10 milliGRAM(s) Oral at bedtime  lisinopril 5 milliGRAM(s) Oral at bedtime  risperiDONE   Tablet 1 milliGRAM(s) Oral at bedtime    MEDICATIONS  (PRN):  acetaminophen     Tablet .. 650 milliGRAM(s) Oral every 6 hours PRN Mild Pain (1 - 3), Moderate Pain (4 - 6)  celecoxib 200 milliGRAM(s) Oral two times a day PRN Pain  haloperidol     Tablet 2.5 milliGRAM(s) Oral every 6 hours PRN agitation  haloperidol    Injectable 2.5 milliGRAM(s) IntraMuscular once PRN agitation

## 2022-02-03 NOTE — BH INPATIENT PSYCHIATRY PROGRESS NOTE - NSBHFUPINTERVALHXFT_PSY_A_CORE
Pt today states she is feeling ok. Continues to endorse thoughts that people are damaging and stealing her belongings at home. Reports fatigue from medication increase.

## 2022-02-04 LAB — SARS-COV-2 RNA SPEC QL NAA+PROBE: SIGNIFICANT CHANGE UP

## 2022-02-04 PROCEDURE — 99232 SBSQ HOSP IP/OBS MODERATE 35: CPT

## 2022-02-04 RX ORDER — RISPERIDONE 4 MG/1
1.5 TABLET ORAL AT BEDTIME
Refills: 0 | Status: DISCONTINUED | OUTPATIENT
Start: 2022-02-04 | End: 2022-02-11

## 2022-02-04 RX ADMIN — ATORVASTATIN CALCIUM 10 MILLIGRAM(S): 80 TABLET, FILM COATED ORAL at 20:46

## 2022-02-04 RX ADMIN — RISPERIDONE 1.5 MILLIGRAM(S): 4 TABLET ORAL at 20:46

## 2022-02-04 RX ADMIN — LISINOPRIL 5 MILLIGRAM(S): 2.5 TABLET ORAL at 20:46

## 2022-02-04 NOTE — BH INPATIENT PSYCHIATRY PROGRESS NOTE - MSE UNSTRUCTURED FT
Dressed appropriately.  Good hygiene and grooming.  Cooperative.  No psychomotor slowing or activation noted.  No abnormal movements noted.  Fairly well related, good eye contact. Speech increasingly pressured, accented, normal prosody.  Mood is "good" affect anxious, labile, full range. Less overinclusive TP today, fair associations.  TC: As per subjective, however this thought content appears to improve.  No SIIP or HIIP.   Insight limited, judgment improved on interview.  Language fluent, gait intact.      Dressed appropriately.  Good hygiene and grooming.  Cooperative.  No psychomotor slowing or activation noted.  No abnormal movements noted.  Fairly well related, good eye contact. Speech increasingly pressured, accented, normal prosody.  Mood is "good" affect anxious, labile, full range. Less overinclusive TP today, fair associations.  TC: Continues to have abnormal thought content.  No SIIP or HIIP.   Insight limited, judgment improved on interview.  Language fluent, gait intact.

## 2022-02-04 NOTE — BH TREATMENT PLAN - NSTXCOPEINTERPR_PSY_ALL_CORE
Psych rehab staff recommends patient engages in individual and group therapy sessions in order to allow patient to gain psychoeducation and support during hospitalization.

## 2022-02-04 NOTE — BH INPATIENT PSYCHIATRY PROGRESS NOTE - NSBHFUPINTERVALHXFT_PSY_A_CORE
Pt today states she is feeling good, however, she continues to endorse thoughts that people are damaging and stealing her belongings at home, stating that she had trouble falling asleep last nights because she was angry thinking about people damaging her belongings at home. Reports improvement of fatigue from medication increase.

## 2022-02-04 NOTE — BH INPATIENT PSYCHIATRY PROGRESS NOTE - NSBHMETABOLIC_PSY_ALL_CORE_FT
BMI: BMI (kg/m2): 28.4 (01-29-22 @ 08:46)  HbA1c:   Glucose: POCT Blood Glucose.: 143 mg/dL (01-28-22 @ 22:34)    BP: 142/87 (02-04-22 @ 08:30) (142/87 - 142/87)  Lipid Panel:

## 2022-02-04 NOTE — BH INPATIENT PSYCHIATRY PROGRESS NOTE - NSBHCHARTREVIEWVS_PSY_A_CORE FT
Vital Signs Last 24 Hrs  T(C): 36.4 (02-04-22 @ 08:30), Max: 36.8 (02-03-22 @ 18:03)  T(F): 97.6 (02-04-22 @ 08:30), Max: 98.3 (02-03-22 @ 18:03)  HR: 65 (02-04-22 @ 08:30) (65 - 65)  BP: 142/87 (02-04-22 @ 08:30) (142/87 - 142/87)  BP(mean): --  RR: --  SpO2: 100% (02-03-22 @ 20:32) (100% - 100%)    Orthostatic VS  02-03-22 @ 20:32  Lying BP: --/-- HR: --  Sitting BP: 153/82 HR: 72  Standing BP: 152/80 HR: 73  Site: --  Mode: --  Orthostatic VS  02-02-22 @ 20:13  Lying BP: --/-- HR: --  Sitting BP: 143/83 HR: 84  Standing BP: 147/82 HR: 89  Site: --  Mode: --

## 2022-02-04 NOTE — BH INPATIENT PSYCHIATRY PROGRESS NOTE - NSBHASSESSSUMMFT_PSY_ALL_CORE
66F admitted with reported cognitive issues and psychosis.  MMSE 27/30 (or 27/29) however high premorbid functioning, may require MOCA for more subtle cognitive deficits.  Intensity of symptoms improving, though symptoms still remain. Will increase Risperdal  66F admitted with reported cognitive issues and psychosis.  MMSE 27/30 (or 27/29) however high premorbid functioning, may require MOCA for more subtle cognitive deficits.  Less agitated with abnormal content, which still remains unchanged. Will increase Risperdal

## 2022-02-04 NOTE — BH INPATIENT PSYCHIATRY PROGRESS NOTE - CURRENT MEDICATION
MEDICATIONS  (STANDING):  atorvastatin 10 milliGRAM(s) Oral at bedtime  lisinopril 5 milliGRAM(s) Oral at bedtime  risperiDONE   Tablet 1.5 milliGRAM(s) Oral at bedtime    MEDICATIONS  (PRN):  acetaminophen     Tablet .. 650 milliGRAM(s) Oral every 6 hours PRN Mild Pain (1 - 3), Moderate Pain (4 - 6)  celecoxib 200 milliGRAM(s) Oral two times a day PRN Pain  haloperidol     Tablet 2.5 milliGRAM(s) Oral every 6 hours PRN agitation  haloperidol    Injectable 2.5 milliGRAM(s) IntraMuscular once PRN agitation

## 2022-02-04 NOTE — BH TREATMENT PLAN - NSTXDCOPLKINTERSW_PSY_ALL_CORE
Writer will educate the patient on the benefits of accepting aftercare and make all necessary referrals.

## 2022-02-05 RX ADMIN — LISINOPRIL 5 MILLIGRAM(S): 2.5 TABLET ORAL at 21:40

## 2022-02-05 RX ADMIN — ATORVASTATIN CALCIUM 10 MILLIGRAM(S): 80 TABLET, FILM COATED ORAL at 21:40

## 2022-02-05 RX ADMIN — RISPERIDONE 1.5 MILLIGRAM(S): 4 TABLET ORAL at 21:40

## 2022-02-06 RX ADMIN — RISPERIDONE 1.5 MILLIGRAM(S): 4 TABLET ORAL at 21:31

## 2022-02-06 RX ADMIN — ATORVASTATIN CALCIUM 10 MILLIGRAM(S): 80 TABLET, FILM COATED ORAL at 21:31

## 2022-02-06 RX ADMIN — LISINOPRIL 5 MILLIGRAM(S): 2.5 TABLET ORAL at 21:31

## 2022-02-07 PROCEDURE — 99231 SBSQ HOSP IP/OBS SF/LOW 25: CPT

## 2022-02-07 RX ORDER — LISINOPRIL 2.5 MG/1
10 TABLET ORAL DAILY
Refills: 0 | Status: DISCONTINUED | OUTPATIENT
Start: 2022-02-07 | End: 2022-02-10

## 2022-02-07 RX ORDER — LANOLIN ALCOHOL/MO/W.PET/CERES
3 CREAM (GRAM) TOPICAL AT BEDTIME
Refills: 0 | Status: DISCONTINUED | OUTPATIENT
Start: 2022-02-07 | End: 2022-02-16

## 2022-02-07 RX ADMIN — Medication 3 MILLIGRAM(S): at 22:00

## 2022-02-07 RX ADMIN — RISPERIDONE 1.5 MILLIGRAM(S): 4 TABLET ORAL at 22:01

## 2022-02-07 RX ADMIN — ATORVASTATIN CALCIUM 10 MILLIGRAM(S): 80 TABLET, FILM COATED ORAL at 22:01

## 2022-02-07 NOTE — BH INPATIENT PSYCHIATRY PROGRESS NOTE - NSBHCHARTREVIEWVS_PSY_A_CORE FT
Vital Signs Last 24 Hrs  T(C): 36.4 (02-07-22 @ 08:45), Max: 36.4 (02-06-22 @ 19:14)  T(F): 97.5 (02-07-22 @ 08:45), Max: 97.6 (02-06-22 @ 19:14)  HR: --  BP: --  BP(mean): --  RR: --  SpO2: --    Orthostatic VS  02-07-22 @ 08:45  Lying BP: --/-- HR: --  Sitting BP: 132/91 HR: 71  Standing BP: --/-- HR: --  Site: --  Mode: --  Orthostatic VS  02-05-22 @ 20:24  Lying BP: --/-- HR: --  Sitting BP: 146/83 HR: 78  Standing BP: 150/87 HR: 80  Site: --  Mode: --   Vital Signs Last 24 Hrs  T(C): 36.3 (02-07-22 @ 19:27), Max: 36.4 (02-07-22 @ 08:45)  T(F): 97.3 (02-07-22 @ 19:27), Max: 97.5 (02-07-22 @ 08:45)  HR: --  BP: --  BP(mean): --  RR: --  SpO2: 100% (02-07-22 @ 20:58) (100% - 100%)    Orthostatic VS  02-07-22 @ 20:58  Lying BP: --/-- HR: --  Sitting BP: 150/77 HR: 78  Standing BP: 160/83 HR: 84  Site: --  Mode: --  Orthostatic VS  02-07-22 @ 08:45  Lying BP: --/-- HR: --  Sitting BP: 132/91 HR: 71  Standing BP: --/-- HR: --  Site: --  Mode: --

## 2022-02-07 NOTE — PHARMACOTHERAPY INTERVENTION NOTE - COMMENTS
Patient's blood pressure not controlled on lisinopril 5 mG qHS. Discussed with provider patient's blood pressures ranged from 134-153 mmHg SBP/76-89 mmHg DBP over that last 5 days.     Lisinopril dose increased to 10 mG daily.

## 2022-02-07 NOTE — BH INPATIENT PSYCHIATRY PROGRESS NOTE - NSBHMETABOLIC_PSY_ALL_CORE_FT
BMI: BMI (kg/m2): 28.4 (01-29-22 @ 08:46)  HbA1c:   Glucose: POCT Blood Glucose.: 143 mg/dL (01-28-22 @ 22:34)    BP: 149/85 (02-06-22 @ 08:20) (138/74 - 149/85)  Lipid Panel:

## 2022-02-07 NOTE — BH INPATIENT PSYCHIATRY PROGRESS NOTE - MSE UNSTRUCTURED FT
Dressed appropriately.  Good hygiene and grooming.  Cooperative.  No psychomotor slowing or activation noted.  No abnormal movements noted.  Fairly well related, good eye contact. Speech increasingly pressured, accented, normal prosody.  Mood is "okay" affect anxious, labile, full range. TP: fair associations.  TC: Continues to have abnormal thought content.  No SIIP or HIIP.   Insight limited, judgment improved on interview.  Language fluent, gait intact.

## 2022-02-07 NOTE — BH INPATIENT PSYCHIATRY PROGRESS NOTE - NSBHFUPINTERVALHXFT_PSY_A_CORE
On interview, patient reports insomnia in the context of staying awake thinking about people sealing and damaging her belongings. Tolerating Risperdal increase well with no reported side effects.  On interview, patient reports insomnia in the context of staying awake thinking about people stealing and damaging her belongings. Tolerating Risperdal increase well with no reported side effects.

## 2022-02-07 NOTE — BH INPATIENT PSYCHIATRY PROGRESS NOTE - NSBHASSESSSUMMFT_PSY_ALL_CORE
66F admitted with reported cognitive issues and psychosis.  MMSE 27/30 (or 27/29) however high premorbid functioning, may require MOCA for more subtle cognitive deficits.  Less agitated with abnormal content centered on idea that people are damaging and stealing her belongings, which still remains unchanged. Will continue Risperdal.

## 2022-02-07 NOTE — BH INPATIENT PSYCHIATRY PROGRESS NOTE - CURRENT MEDICATION
MEDICATIONS  (STANDING):  atorvastatin 10 milliGRAM(s) Oral at bedtime  lisinopril 10 milliGRAM(s) Oral daily  melatonin. 3 milliGRAM(s) Oral at bedtime  risperiDONE   Tablet 1.5 milliGRAM(s) Oral at bedtime    MEDICATIONS  (PRN):  acetaminophen     Tablet .. 650 milliGRAM(s) Oral every 6 hours PRN Mild Pain (1 - 3), Moderate Pain (4 - 6)  celecoxib 200 milliGRAM(s) Oral two times a day PRN Pain  haloperidol     Tablet 2.5 milliGRAM(s) Oral every 6 hours PRN agitation  haloperidol    Injectable 2.5 milliGRAM(s) IntraMuscular once PRN agitation

## 2022-02-08 LAB — SARS-COV-2 RNA SPEC QL NAA+PROBE: SIGNIFICANT CHANGE UP

## 2022-02-08 PROCEDURE — 99231 SBSQ HOSP IP/OBS SF/LOW 25: CPT

## 2022-02-08 RX ADMIN — RISPERIDONE 1.5 MILLIGRAM(S): 4 TABLET ORAL at 21:33

## 2022-02-08 RX ADMIN — CELECOXIB 200 MILLIGRAM(S): 200 CAPSULE ORAL at 12:04

## 2022-02-08 RX ADMIN — ATORVASTATIN CALCIUM 10 MILLIGRAM(S): 80 TABLET, FILM COATED ORAL at 21:32

## 2022-02-08 RX ADMIN — LISINOPRIL 10 MILLIGRAM(S): 2.5 TABLET ORAL at 08:31

## 2022-02-08 RX ADMIN — CELECOXIB 200 MILLIGRAM(S): 200 CAPSULE ORAL at 13:32

## 2022-02-08 RX ADMIN — Medication 3 MILLIGRAM(S): at 21:33

## 2022-02-08 NOTE — BH INPATIENT PSYCHIATRY PROGRESS NOTE - NSBHCHARTREVIEWVS_PSY_A_CORE FT
Vital Signs Last 24 Hrs  T(C): 36.7 (02-08-22 @ 08:12), Max: 36.7 (02-08-22 @ 08:12)  T(F): 98.1 (02-08-22 @ 08:12), Max: 98.1 (02-08-22 @ 08:12)  HR: --  BP: --  BP(mean): --  RR: --  SpO2: 100% (02-07-22 @ 20:58) (100% - 100%)    Orthostatic VS  02-08-22 @ 08:12  Lying BP: --/-- HR: --  Sitting BP: 149/78 HR: 76  Standing BP: --/-- HR: --  Site: --  Mode: --  Orthostatic VS  02-07-22 @ 20:58  Lying BP: --/-- HR: --  Sitting BP: 150/77 HR: 78  Standing BP: 160/83 HR: 84  Site: --  Mode: --  Orthostatic VS  02-07-22 @ 08:45  Lying BP: --/-- HR: --  Sitting BP: 132/91 HR: 71  Standing BP: --/-- HR: --  Site: --  Mode: --   Vital Signs Last 24 Hrs  T(C): 36.9 (02-08-22 @ 18:34), Max: 36.9 (02-08-22 @ 18:34)  T(F): 98.4 (02-08-22 @ 18:34), Max: 98.4 (02-08-22 @ 18:34)  HR: --  BP: --  BP(mean): --  RR: --  SpO2: --    Orthostatic VS  02-08-22 @ 08:12  Lying BP: --/-- HR: --  Sitting BP: 149/78 HR: 76  Standing BP: --/-- HR: --  Site: --  Mode: --  Orthostatic VS  02-07-22 @ 20:58  Lying BP: --/-- HR: --  Sitting BP: 150/77 HR: 78  Standing BP: 160/83 HR: 84  Site: --  Mode: --  Orthostatic VS  02-07-22 @ 08:45  Lying BP: --/-- HR: --  Sitting BP: 132/91 HR: 71  Standing BP: --/-- HR: --  Site: --  Mode: --

## 2022-02-08 NOTE — BH INPATIENT PSYCHIATRY PROGRESS NOTE - NSBHMETABOLIC_PSY_ALL_CORE_FT
BMI: BMI (kg/m2): 28.4 (01-29-22 @ 08:46)  HbA1c:   Glucose: POCT Blood Glucose.: 143 mg/dL (01-28-22 @ 22:34)    BP: 149/85 (02-06-22 @ 08:20) (149/85 - 149/85)  Lipid Panel:

## 2022-02-08 NOTE — BH INPATIENT PSYCHIATRY PROGRESS NOTE - NSBHASSESSSUMMFT_PSY_ALL_CORE
66F admitted with reported cognitive issues and psychosis.  MMSE 27/30 (or 27/29) however high premorbid functioning, may require MOCA for more subtle cognitive deficits.  Less agitated with abnormal content centered on idea that people are damaging and stealing her belongings, which still remains unchanged. Will continue Risperdal. Improvement of sleep on melatonin.  66F admitted with reported cognitive issues and psychosis.  Psychosis present but no agitation.  Will continue Risperdal.

## 2022-02-08 NOTE — BH INPATIENT PSYCHIATRY PROGRESS NOTE - NSBHFUPINTERVALHXFT_PSY_A_CORE
On interview, patient reports improvement of insomnia but still has trouble thinking about people stealing and damaging her belongings, making it harder for her to fall asleep. Tolerating Risperdal increase well with no reported side effects.

## 2022-02-08 NOTE — BH INPATIENT PSYCHIATRY PROGRESS NOTE - MSE UNSTRUCTURED FT
Dressed appropriately.  Good hygiene and grooming.  Cooperative.  No psychomotor slowing or activation noted.  No abnormal movements noted.  Fairly well related, good eye contact. Speech normal rate, accented, normal prosody.  Mood is "okay" affect anxious, labile, full range. TP: fair associations.  TC: Continues to have abnormal thought content.  No SIIP or HIIP.   Insight limited, judgment improved on interview.  Language fluent, gait intact.

## 2022-02-09 PROCEDURE — 99231 SBSQ HOSP IP/OBS SF/LOW 25: CPT

## 2022-02-09 RX ADMIN — Medication 30 MILLILITER(S): at 17:12

## 2022-02-09 RX ADMIN — RISPERIDONE 1.5 MILLIGRAM(S): 4 TABLET ORAL at 21:35

## 2022-02-09 RX ADMIN — ATORVASTATIN CALCIUM 10 MILLIGRAM(S): 80 TABLET, FILM COATED ORAL at 21:36

## 2022-02-09 RX ADMIN — LISINOPRIL 10 MILLIGRAM(S): 2.5 TABLET ORAL at 08:06

## 2022-02-09 RX ADMIN — Medication 3 MILLIGRAM(S): at 21:35

## 2022-02-09 NOTE — BH INPATIENT PSYCHIATRY PROGRESS NOTE - NSBHFUPINTERVALHXFT_PSY_A_CORE
No acute events overnight. On interview, patient reports improvement of insomnia and thoughts that people stealing and damaging her belongings. She revealed that if her family no longer wants her to stay with them, she can return to live in [place]. She reports a headache that fluctuates in intensity and is associated with stress.

## 2022-02-09 NOTE — BH INPATIENT PSYCHIATRY PROGRESS NOTE - CURRENT MEDICATION
MEDICATIONS  (STANDING):  atorvastatin 10 milliGRAM(s) Oral at bedtime  lisinopril 10 milliGRAM(s) Oral daily  melatonin. 3 milliGRAM(s) Oral at bedtime  risperiDONE   Tablet 1.5 milliGRAM(s) Oral at bedtime    MEDICATIONS  (PRN):  acetaminophen     Tablet .. 650 milliGRAM(s) Oral every 6 hours PRN Mild Pain (1 - 3), Moderate Pain (4 - 6)  aluminum hydroxide/magnesium hydroxide/simethicone Suspension 30 milliLiter(s) Oral every 4 hours PRN Dyspepsia  celecoxib 200 milliGRAM(s) Oral two times a day PRN Pain  haloperidol     Tablet 2.5 milliGRAM(s) Oral every 6 hours PRN agitation  haloperidol    Injectable 2.5 milliGRAM(s) IntraMuscular once PRN agitation

## 2022-02-09 NOTE — BH INPATIENT PSYCHIATRY PROGRESS NOTE - NSBHASSESSSUMMFT_PSY_ALL_CORE
66F admitted with reported cognitive issues and psychosis.  Paranoid thoughts may be secondary to psychosis or dementia. Will continue Risperdal.  66F admitted with reported cognitive issues and psychosis.  Paranoid thoughts may be secondary to psychosis or suspected neurocognitive. Will continue Risperdal.

## 2022-02-09 NOTE — BH INPATIENT PSYCHIATRY PROGRESS NOTE - MSE UNSTRUCTURED FT
Dressed appropriately.  Good hygiene and grooming.  Cooperative.  No psychomotor slowing or activation noted.  No abnormal movements noted.  Fairly well related, good eye contact. Speech normal rate, accented, normal prosody.  Mood is "okay" affect anxious, labile, full range, appropriate to context. TP: fair associations.  TC: Continues to have abnormal thought content.  No SIIP or HIIP.   Insight limited, judgment improved on interview.  Language fluent, gait intact.

## 2022-02-09 NOTE — BH INPATIENT PSYCHIATRY PROGRESS NOTE - NSBHMETABOLIC_PSY_ALL_CORE_FT
BMI: BMI (kg/m2): 28.4 (01-29-22 @ 08:46)  HbA1c:   Glucose: POCT Blood Glucose.: 143 mg/dL (01-28-22 @ 22:34)    BP: --  Lipid Panel:  BMI: BMI (kg/m2): 28.4 (01-29-22 @ 08:46)  HbA1c:   Glucose: POCT Blood Glucose.: 143 mg/dL (01-28-22 @ 22:34)    BP: 146/72 (02-10-22 @ 08:15) (146/72 - 146/72)  Lipid Panel:

## 2022-02-09 NOTE — BH INPATIENT PSYCHIATRY PROGRESS NOTE - NSBHCHARTREVIEWVS_PSY_A_CORE FT
Vital Signs Last 24 Hrs  T(C): 36.6 (02-09-22 @ 08:22), Max: 36.9 (02-08-22 @ 18:34)  T(F): 97.8 (02-09-22 @ 08:22), Max: 98.4 (02-08-22 @ 18:34)  HR: --  BP: --  BP(mean): --  RR: --  SpO2: --    Orthostatic VS  02-09-22 @ 08:22  Lying BP: --/-- HR: --  Sitting BP: 137/80 HR: 79  Standing BP: --/-- HR: --  Site: --  Mode: --  Orthostatic VS  02-08-22 @ 08:12  Lying BP: --/-- HR: --  Sitting BP: 149/78 HR: 76  Standing BP: --/-- HR: --  Site: --  Mode: --  Orthostatic VS  02-07-22 @ 20:58  Lying BP: --/-- HR: --  Sitting BP: 150/77 HR: 78  Standing BP: 160/83 HR: 84  Site: --  Mode: --   Vital Signs Last 24 Hrs  T(C): 36.5 (02-10-22 @ 08:15), Max: 37 (02-09-22 @ 18:09)  T(F): 97.7 (02-10-22 @ 08:15), Max: 98.6 (02-09-22 @ 18:09)  HR: 63 (02-10-22 @ 08:15) (63 - 63)  BP: 146/72 (02-10-22 @ 08:15) (146/72 - 146/72)  BP(mean): --  RR: --  SpO2: --    Orthostatic VS  02-09-22 @ 08:22  Lying BP: --/-- HR: --  Sitting BP: 137/80 HR: 79  Standing BP: --/-- HR: --  Site: --  Mode: --

## 2022-02-10 LAB
ALBUMIN SERPL ELPH-MCNC: 3.6 G/DL — SIGNIFICANT CHANGE UP (ref 3.3–5)
ALP SERPL-CCNC: 103 U/L — SIGNIFICANT CHANGE UP (ref 40–120)
ALT FLD-CCNC: 35 U/L — HIGH (ref 4–33)
ANION GAP SERPL CALC-SCNC: 8 MMOL/L — SIGNIFICANT CHANGE UP (ref 7–14)
AST SERPL-CCNC: 29 U/L — SIGNIFICANT CHANGE UP (ref 4–32)
BASOPHILS # BLD AUTO: 0.05 K/UL — SIGNIFICANT CHANGE UP (ref 0–0.2)
BASOPHILS NFR BLD AUTO: 0.9 % — SIGNIFICANT CHANGE UP (ref 0–2)
BILIRUB SERPL-MCNC: 0.4 MG/DL — SIGNIFICANT CHANGE UP (ref 0.2–1.2)
BUN SERPL-MCNC: 22 MG/DL — SIGNIFICANT CHANGE UP (ref 7–23)
CALCIUM SERPL-MCNC: 9.3 MG/DL — SIGNIFICANT CHANGE UP (ref 8.4–10.5)
CHLORIDE SERPL-SCNC: 102 MMOL/L — SIGNIFICANT CHANGE UP (ref 98–107)
CO2 SERPL-SCNC: 27 MMOL/L — SIGNIFICANT CHANGE UP (ref 22–31)
CREAT SERPL-MCNC: 1.03 MG/DL — SIGNIFICANT CHANGE UP (ref 0.5–1.3)
EOSINOPHIL # BLD AUTO: 0.18 K/UL — SIGNIFICANT CHANGE UP (ref 0–0.5)
EOSINOPHIL NFR BLD AUTO: 3.3 % — SIGNIFICANT CHANGE UP (ref 0–6)
GLUCOSE SERPL-MCNC: 96 MG/DL — SIGNIFICANT CHANGE UP (ref 70–99)
HCT VFR BLD CALC: 37.4 % — SIGNIFICANT CHANGE UP (ref 34.5–45)
HGB BLD-MCNC: 12.2 G/DL — SIGNIFICANT CHANGE UP (ref 11.5–15.5)
IANC: 2.85 K/UL — SIGNIFICANT CHANGE UP (ref 1.5–8.5)
IMM GRANULOCYTES NFR BLD AUTO: 0.2 % — SIGNIFICANT CHANGE UP (ref 0–1.5)
LYMPHOCYTES # BLD AUTO: 1.86 K/UL — SIGNIFICANT CHANGE UP (ref 1–3.3)
LYMPHOCYTES # BLD AUTO: 34.2 % — SIGNIFICANT CHANGE UP (ref 13–44)
MCHC RBC-ENTMCNC: 28.6 PG — SIGNIFICANT CHANGE UP (ref 27–34)
MCHC RBC-ENTMCNC: 32.6 GM/DL — SIGNIFICANT CHANGE UP (ref 32–36)
MCV RBC AUTO: 87.6 FL — SIGNIFICANT CHANGE UP (ref 80–100)
MONOCYTES # BLD AUTO: 0.49 K/UL — SIGNIFICANT CHANGE UP (ref 0–0.9)
MONOCYTES NFR BLD AUTO: 9 % — SIGNIFICANT CHANGE UP (ref 2–14)
NEUTROPHILS # BLD AUTO: 2.85 K/UL — SIGNIFICANT CHANGE UP (ref 1.8–7.4)
NEUTROPHILS NFR BLD AUTO: 52.4 % — SIGNIFICANT CHANGE UP (ref 43–77)
NRBC # BLD: 0 /100 WBCS — SIGNIFICANT CHANGE UP
NRBC # FLD: 0 K/UL — SIGNIFICANT CHANGE UP
PLATELET # BLD AUTO: 192 K/UL — SIGNIFICANT CHANGE UP (ref 150–400)
POTASSIUM SERPL-MCNC: 4.1 MMOL/L — SIGNIFICANT CHANGE UP (ref 3.5–5.3)
POTASSIUM SERPL-SCNC: 4.1 MMOL/L — SIGNIFICANT CHANGE UP (ref 3.5–5.3)
PROLACTIN SERPL-MCNC: 106 NG/ML — HIGH (ref 3.4–24.1)
PROT SERPL-MCNC: 6.4 G/DL — SIGNIFICANT CHANGE UP (ref 6–8.3)
RBC # BLD: 4.27 M/UL — SIGNIFICANT CHANGE UP (ref 3.8–5.2)
RBC # FLD: 12.7 % — SIGNIFICANT CHANGE UP (ref 10.3–14.5)
SODIUM SERPL-SCNC: 137 MMOL/L — SIGNIFICANT CHANGE UP (ref 135–145)
WBC # BLD: 5.44 K/UL — SIGNIFICANT CHANGE UP (ref 3.8–10.5)
WBC # FLD AUTO: 5.44 K/UL — SIGNIFICANT CHANGE UP (ref 3.8–10.5)

## 2022-02-10 PROCEDURE — 99231 SBSQ HOSP IP/OBS SF/LOW 25: CPT

## 2022-02-10 RX ORDER — LISINOPRIL 2.5 MG/1
10 TABLET ORAL DAILY
Refills: 0 | Status: COMPLETED | OUTPATIENT
Start: 2022-02-10 | End: 2022-02-10

## 2022-02-10 RX ORDER — LISINOPRIL 2.5 MG/1
20 TABLET ORAL DAILY
Refills: 0 | Status: DISCONTINUED | OUTPATIENT
Start: 2022-02-11 | End: 2022-02-16

## 2022-02-10 RX ADMIN — LISINOPRIL 10 MILLIGRAM(S): 2.5 TABLET ORAL at 13:21

## 2022-02-10 RX ADMIN — RISPERIDONE 1.5 MILLIGRAM(S): 4 TABLET ORAL at 21:04

## 2022-02-10 RX ADMIN — Medication 3 MILLIGRAM(S): at 21:04

## 2022-02-10 RX ADMIN — ATORVASTATIN CALCIUM 10 MILLIGRAM(S): 80 TABLET, FILM COATED ORAL at 21:04

## 2022-02-10 RX ADMIN — LISINOPRIL 10 MILLIGRAM(S): 2.5 TABLET ORAL at 08:51

## 2022-02-10 NOTE — BH INPATIENT PSYCHIATRY PROGRESS NOTE - NSBHASSESSSUMMFT_PSY_ALL_CORE
66F admitted with reported cognitive issues and psychosis.  Paranoid thoughts may be secondary to psychosis or suspected neurocognitive. Will continue Risperdal.  Will increase lisinopril.  Unclear dispo at this time.

## 2022-02-10 NOTE — BH INPATIENT PSYCHIATRY PROGRESS NOTE - NSBHFUPINTERVALHXFT_PSY_A_CORE
[id number].  Pt continues to report reduction in thoughts that family was stealing or breaking her items.  Still has a headache, denies hx of migraines or headaches prior to this admission.

## 2022-02-10 NOTE — BH INPATIENT PSYCHIATRY PROGRESS NOTE - NSBHMETABOLIC_PSY_ALL_CORE_FT
BMI: BMI (kg/m2): 28.4 (01-29-22 @ 08:46)  HbA1c:   Glucose: POCT Blood Glucose.: 143 mg/dL (01-28-22 @ 22:34)    BP: 146/72 (02-10-22 @ 08:15) (146/72 - 146/72)  Lipid Panel:

## 2022-02-10 NOTE — BH INPATIENT PSYCHIATRY PROGRESS NOTE - NSBHCHARTREVIEWVS_PSY_A_CORE FT
Vital Signs Last 24 Hrs  T(C): 36.5 (02-10-22 @ 08:15), Max: 37 (02-09-22 @ 18:09)  T(F): 97.7 (02-10-22 @ 08:15), Max: 98.6 (02-09-22 @ 18:09)  HR: 63 (02-10-22 @ 08:15) (63 - 63)  BP: 146/72 (02-10-22 @ 08:15) (146/72 - 146/72)  BP(mean): --  RR: --  SpO2: --    Orthostatic VS  02-09-22 @ 08:22  Lying BP: --/-- HR: --  Sitting BP: 137/80 HR: 79  Standing BP: --/-- HR: --  Site: --  Mode: --

## 2022-02-10 NOTE — BH INPATIENT PSYCHIATRY PROGRESS NOTE - CURRENT MEDICATION
MEDICATIONS  (STANDING):  atorvastatin 10 milliGRAM(s) Oral at bedtime  melatonin. 3 milliGRAM(s) Oral at bedtime  risperiDONE   Tablet 1.5 milliGRAM(s) Oral at bedtime    MEDICATIONS  (PRN):  acetaminophen     Tablet .. 650 milliGRAM(s) Oral every 6 hours PRN Mild Pain (1 - 3), Moderate Pain (4 - 6)  aluminum hydroxide/magnesium hydroxide/simethicone Suspension 30 milliLiter(s) Oral every 4 hours PRN Dyspepsia  celecoxib 200 milliGRAM(s) Oral two times a day PRN Pain  haloperidol     Tablet 2.5 milliGRAM(s) Oral every 6 hours PRN agitation  haloperidol    Injectable 2.5 milliGRAM(s) IntraMuscular once PRN agitation

## 2022-02-10 NOTE — BH INPATIENT PSYCHIATRY PROGRESS NOTE - MSE UNSTRUCTURED FT
Dressed appropriately.  Good hygiene and grooming.  Cooperative.  No psychomotor slowing or activation noted.  No abnormal movements noted.  Fairly well related, good eye contact. Speech normal rate, accented, normal prosody.  Mood is "okay" affect anxious, labile, full range, appropriate to context. TP: fair associations.  TC: Reporting reduction in abnormal thought content.  No SIIP or HIIP.   Insight limited, judgment improved on interview.  Language fluent, gait intact.

## 2022-02-11 PROCEDURE — 99232 SBSQ HOSP IP/OBS MODERATE 35: CPT

## 2022-02-11 RX ORDER — RISPERIDONE 4 MG/1
2 TABLET ORAL AT BEDTIME
Refills: 0 | Status: DISCONTINUED | OUTPATIENT
Start: 2022-02-11 | End: 2022-02-16

## 2022-02-11 RX ADMIN — ATORVASTATIN CALCIUM 10 MILLIGRAM(S): 80 TABLET, FILM COATED ORAL at 21:23

## 2022-02-11 RX ADMIN — Medication 3 MILLIGRAM(S): at 21:23

## 2022-02-11 RX ADMIN — LISINOPRIL 20 MILLIGRAM(S): 2.5 TABLET ORAL at 08:51

## 2022-02-11 RX ADMIN — RISPERIDONE 2 MILLIGRAM(S): 4 TABLET ORAL at 21:23

## 2022-02-11 NOTE — BH INPATIENT PSYCHIATRY PROGRESS NOTE - NSBHCHARTREVIEWVS_PSY_A_CORE FT
Vital Signs Last 24 Hrs  T(C): 36.2 (02-11-22 @ 07:50), Max: 37.2 (02-10-22 @ 17:57)  T(F): 97.1 (02-11-22 @ 07:50), Max: 98.9 (02-10-22 @ 17:57)  HR: 60 (02-11-22 @ 07:50) (60 - 60)  BP: 155/74 (02-11-22 @ 07:50) (155/74 - 155/74)  BP(mean): --  RR: --  SpO2: --

## 2022-02-11 NOTE — BH INPATIENT PSYCHIATRY PROGRESS NOTE - NSBHFUPINTERVALHXFT_PSY_A_CORE
Pt continues to report reduction in thoughts that family was stealing or breaking her items.  She reports an improvement in headache.   Pt continues to report less thoughts that family was stealing or breaking her items.  She reports an improvement in headache.

## 2022-02-11 NOTE — BH INPATIENT PSYCHIATRY PROGRESS NOTE - NSBHMETABOLIC_PSY_ALL_CORE_FT
BMI: BMI (kg/m2): 28.4 (01-29-22 @ 08:46)  HbA1c:   Glucose: POCT Blood Glucose.: 143 mg/dL (01-28-22 @ 22:34)    BP: 155/74 (02-11-22 @ 07:50) (146/72 - 155/74)  Lipid Panel:

## 2022-02-11 NOTE — BH INPATIENT PSYCHIATRY PROGRESS NOTE - MSE UNSTRUCTURED FT
Dressed appropriately.  Good hygiene and grooming.  Cooperative.  No psychomotor slowing or activation noted.  No abnormal movements noted.  Fairly well related, good eye contact. Speech slightly increased rate, accented, normal prosody.  Mood is "okay, improving" affect anxious, labile, full range, appropriate to context. TP: fair associations.  TC: Reporting reduction in abnormal thought content.  No SIIP or HIIP.   Insight limited, judgment improved on interview.  Language fluent, gait intact.      Dressed appropriately.  Good hygiene and grooming.  Cooperative.  No psychomotor slowing or activation noted.  No abnormal movements noted.  Fairly well related, good eye contact. Speech slightly increased rate, accented, normal prosody.  Mood is "okay, improving" affect pleasant, stable, full range, appropriate to context. TP: fair associations.  TC: Reporting reduction in abnormal thought content.  No SIIP or HIIP.   Insight limited, judgment improved on interview.  Language fluent, gait intact.

## 2022-02-11 NOTE — BH INPATIENT PSYCHIATRY PROGRESS NOTE - NSBHASSESSSUMMFT_PSY_ALL_CORE
66F admitted with reported cognitive issues and psychosis.  Paranoid thoughts may be secondary to psychosis or suspected neurocognitive. Will continue Risperdal titration.  Increased Lisinopril. Unclear dispo at this time.

## 2022-02-11 NOTE — BH INPATIENT PSYCHIATRY PROGRESS NOTE - CURRENT MEDICATION
MEDICATIONS  (STANDING):  atorvastatin 10 milliGRAM(s) Oral at bedtime  lisinopril 20 milliGRAM(s) Oral daily  melatonin. 3 milliGRAM(s) Oral at bedtime  risperiDONE   Tablet 1.5 milliGRAM(s) Oral at bedtime    MEDICATIONS  (PRN):  acetaminophen     Tablet .. 650 milliGRAM(s) Oral every 6 hours PRN Mild Pain (1 - 3), Moderate Pain (4 - 6)  aluminum hydroxide/magnesium hydroxide/simethicone Suspension 30 milliLiter(s) Oral every 4 hours PRN Dyspepsia  celecoxib 200 milliGRAM(s) Oral two times a day PRN Pain  haloperidol     Tablet 2.5 milliGRAM(s) Oral every 6 hours PRN agitation  haloperidol    Injectable 2.5 milliGRAM(s) IntraMuscular once PRN agitation   MEDICATIONS  (STANDING):  atorvastatin 10 milliGRAM(s) Oral at bedtime  lisinopril 20 milliGRAM(s) Oral daily  melatonin. 3 milliGRAM(s) Oral at bedtime  risperiDONE   Tablet 2 milliGRAM(s) Oral at bedtime    MEDICATIONS  (PRN):  acetaminophen     Tablet .. 650 milliGRAM(s) Oral every 6 hours PRN Mild Pain (1 - 3), Moderate Pain (4 - 6)  aluminum hydroxide/magnesium hydroxide/simethicone Suspension 30 milliLiter(s) Oral every 4 hours PRN Dyspepsia  celecoxib 200 milliGRAM(s) Oral two times a day PRN Pain  haloperidol     Tablet 2.5 milliGRAM(s) Oral every 6 hours PRN agitation  haloperidol    Injectable 2.5 milliGRAM(s) IntraMuscular once PRN agitation

## 2022-02-12 LAB — SARS-COV-2 RNA SPEC QL NAA+PROBE: SIGNIFICANT CHANGE UP

## 2022-02-12 RX ADMIN — LISINOPRIL 20 MILLIGRAM(S): 2.5 TABLET ORAL at 08:50

## 2022-02-12 RX ADMIN — RISPERIDONE 2 MILLIGRAM(S): 4 TABLET ORAL at 20:59

## 2022-02-12 RX ADMIN — ATORVASTATIN CALCIUM 10 MILLIGRAM(S): 80 TABLET, FILM COATED ORAL at 20:59

## 2022-02-12 RX ADMIN — Medication 3 MILLIGRAM(S): at 20:59

## 2022-02-13 RX ADMIN — LISINOPRIL 20 MILLIGRAM(S): 2.5 TABLET ORAL at 08:28

## 2022-02-13 RX ADMIN — ATORVASTATIN CALCIUM 10 MILLIGRAM(S): 80 TABLET, FILM COATED ORAL at 20:52

## 2022-02-13 RX ADMIN — Medication 3 MILLIGRAM(S): at 20:51

## 2022-02-13 RX ADMIN — Medication 30 MILLILITER(S): at 15:36

## 2022-02-13 RX ADMIN — RISPERIDONE 2 MILLIGRAM(S): 4 TABLET ORAL at 20:51

## 2022-02-14 PROCEDURE — 99231 SBSQ HOSP IP/OBS SF/LOW 25: CPT

## 2022-02-14 RX ADMIN — RISPERIDONE 2 MILLIGRAM(S): 4 TABLET ORAL at 20:24

## 2022-02-14 RX ADMIN — ATORVASTATIN CALCIUM 10 MILLIGRAM(S): 80 TABLET, FILM COATED ORAL at 20:24

## 2022-02-14 RX ADMIN — LISINOPRIL 20 MILLIGRAM(S): 2.5 TABLET ORAL at 08:20

## 2022-02-14 RX ADMIN — Medication 3 MILLIGRAM(S): at 20:25

## 2022-02-14 NOTE — BH INPATIENT PSYCHIATRY PROGRESS NOTE - MSE UNSTRUCTURED FT
Dressed appropriately.  Good hygiene and grooming.  Cooperative.  No psychomotor slowing or activation noted.  No abnormal movements noted.  Fairly well related, good eye contact. Speech slightly increased rate, accented, normal prosody.  Mood is "okay" affect pleasant, stable, full range, appropriate to context. TP: fair associations.  TC: Reporting continued reduction in abnormal thought content.  No SIIP or HIIP.   Insight limited, judgment improved on interview.  Language fluent, gait intact.

## 2022-02-14 NOTE — BH INPATIENT PSYCHIATRY PROGRESS NOTE - NSBHASSESSSUMMFT_PSY_ALL_CORE
66F admitted with reported cognitive issues and psychosis.  Paranoid thoughts may be secondary to psychosis or suspected neurocognitive. Will continue Risperdal titration.  Increased Lisinopril with continued elevated BP. Will consult medicine for further advice on managing BP. Unclear dispo at this time.

## 2022-02-14 NOTE — BH INPATIENT PSYCHIATRY PROGRESS NOTE - NSBHFUPINTERVALHXFT_PSY_A_CORE
Pt visible on the unit. On interview, pt denies thoughts of family stealing from her or breaking her items. She denies speaking to family about placement.  Pt visible on the unit. On interview, pt denies thoughts of family stealing from her or breaking her items.

## 2022-02-14 NOTE — BH INPATIENT PSYCHIATRY PROGRESS NOTE - NSBHMETABOLIC_PSY_ALL_CORE_FT
BMI: BMI (kg/m2): 28.4 (01-29-22 @ 08:46)  HbA1c:   Glucose: POCT Blood Glucose.: 143 mg/dL (01-28-22 @ 22:34)    BP: 144/76 (02-13-22 @ 08:21) (144/76 - 144/76)  Lipid Panel:

## 2022-02-14 NOTE — BH INPATIENT PSYCHIATRY PROGRESS NOTE - CURRENT MEDICATION
MEDICATIONS  (STANDING):  atorvastatin 10 milliGRAM(s) Oral at bedtime  lisinopril 20 milliGRAM(s) Oral daily  melatonin. 3 milliGRAM(s) Oral at bedtime  risperiDONE   Tablet 2 milliGRAM(s) Oral at bedtime    MEDICATIONS  (PRN):  acetaminophen     Tablet .. 650 milliGRAM(s) Oral every 6 hours PRN Mild Pain (1 - 3), Moderate Pain (4 - 6)  aluminum hydroxide/magnesium hydroxide/simethicone Suspension 30 milliLiter(s) Oral every 4 hours PRN Dyspepsia  celecoxib 200 milliGRAM(s) Oral two times a day PRN Pain  haloperidol     Tablet 2.5 milliGRAM(s) Oral every 6 hours PRN agitation  haloperidol    Injectable 2.5 milliGRAM(s) IntraMuscular once PRN agitation

## 2022-02-14 NOTE — BH INPATIENT PSYCHIATRY PROGRESS NOTE - NSBHCHARTREVIEWVS_PSY_A_CORE FT
Vital Signs Last 24 Hrs  T(C): 36.4 (02-14-22 @ 08:14), Max: 37.2 (02-13-22 @ 18:49)  T(F): 97.6 (02-14-22 @ 08:14), Max: 98.9 (02-13-22 @ 18:49)  HR: --  BP: --  BP(mean): --  RR: --  SpO2: --    Orthostatic VS  02-14-22 @ 08:14  Lying BP: --/-- HR: --  Sitting BP: 151/76 HR: 62  Standing BP: 151/75 HR: 69  Site: --  Mode: --   Vital Signs Last 24 Hrs  T(C): 36.6 (02-14-22 @ 17:53), Max: 36.6 (02-14-22 @ 17:53)  T(F): 97.9 (02-14-22 @ 17:53), Max: 97.9 (02-14-22 @ 17:53)  HR: --  BP: --  BP(mean): --  RR: --  SpO2: --    Orthostatic VS  02-14-22 @ 08:14  Lying BP: --/-- HR: --  Sitting BP: 151/76 HR: 62  Standing BP: 151/75 HR: 69  Site: --  Mode: --

## 2022-02-15 PROBLEM — E78.5 HYPERLIPIDEMIA, UNSPECIFIED: Chronic | Status: ACTIVE | Noted: 2022-01-29

## 2022-02-15 PROBLEM — I10 ESSENTIAL (PRIMARY) HYPERTENSION: Chronic | Status: ACTIVE | Noted: 2022-01-29

## 2022-02-15 PROCEDURE — 99238 HOSP IP/OBS DSCHRG MGMT 30/<: CPT

## 2022-02-15 RX ORDER — LISINOPRIL 2.5 MG/1
1 TABLET ORAL
Qty: 14 | Refills: 0
Start: 2022-02-15 | End: 2022-02-28

## 2022-02-15 RX ORDER — RISPERIDONE 4 MG/1
1 TABLET ORAL
Qty: 14 | Refills: 0
Start: 2022-02-15 | End: 2022-02-28

## 2022-02-15 RX ORDER — LANOLIN ALCOHOL/MO/W.PET/CERES
1 CREAM (GRAM) TOPICAL
Qty: 14 | Refills: 0
Start: 2022-02-15 | End: 2022-02-28

## 2022-02-15 RX ADMIN — LISINOPRIL 20 MILLIGRAM(S): 2.5 TABLET ORAL at 08:06

## 2022-02-15 RX ADMIN — RISPERIDONE 2 MILLIGRAM(S): 4 TABLET ORAL at 21:41

## 2022-02-15 RX ADMIN — Medication 3 MILLIGRAM(S): at 21:41

## 2022-02-15 RX ADMIN — ATORVASTATIN CALCIUM 10 MILLIGRAM(S): 80 TABLET, FILM COATED ORAL at 21:41

## 2022-02-15 NOTE — BH DISCHARGE NOTE NURSING/SOCIAL WORK/PSYCH REHAB - NSDCPRGOAL_PSY_ALL_CORE
Patient has demonstrated progress towards psychiatric rehabilitation goals during current hospitalization. Patient has demonstrated daily medication compliance and is tolerating medications well. Patient endorsed overall improvements in symptoms compared to admission. Patient was able to identify watching TV, socializing and reading as effective coping skills to manage symptoms. Patient reports feeling confident in ability to utilize coping skills post discharge. Patient has attended approximately 75 percent of psychiatric rehabilitation groups during current hospitalization. Patient was verbal and engaged in group discussions and activities. Patient was able to tolerate group structure and did not require redirection. Patient was visible in the milieu. Patient increasingly socialized with select peers appropriately. Patient was able to make needs known to staff. Patient has demonstrated improved insight into the current episode and was able to identify conflicts with family, negative thoughts and feeling angry/sad as warning signs that a crisis may be developing. Patient expressed readiness for discharge. Patient was provided with a Press Ganey survey to complete prior to discharge.

## 2022-02-15 NOTE — CHART NOTE - NSCHARTNOTEFT_GEN_A_CORE
Called by primary team, about BP med rec. Was on amlodipine 2.5mg. Was on lisinopril in house. which was titrated up to 20mg  Per discussion recommend discharge on lisinopril 20mg (dc off amlodipine) and schedule pcp appointmetn within 1-2 weeks for BP check and med titration

## 2022-02-15 NOTE — BH INPATIENT PSYCHIATRY DISCHARGE NOTE - NSBHATTESTSEENBY_PSY_A_CORE
All other review of systems negative, except as noted in HPI Attending Psychiatrist supervising NP/Trainee, meeting pt...

## 2022-02-15 NOTE — BH DISCHARGE NOTE NURSING/SOCIAL WORK/PSYCH REHAB - DISCHARGE INSTRUCTIONS AFTERCARE APPOINTMENTS
In order to check the location, date, or time of your aftercare appointment, please refer to your Discharge Instructions Document given to you upon leaving the hospital.  If you have lost the instructions please call [phone]

## 2022-02-15 NOTE — BH INPATIENT PSYCHIATRY DISCHARGE NOTE - NSDCMRMEDTOKEN_GEN_ALL_CORE_FT
lisinopril 20 mg oral tablet: 1 tab(s) orally once a day  melatonin 3 mg oral tablet: 1 tab(s) orally once a day (at bedtime)  risperiDONE 2 mg oral tablet: 1 tab(s) orally once a day (at bedtime)

## 2022-02-15 NOTE — BH INPATIENT PSYCHIATRY DISCHARGE NOTE - DESCRIPTION
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
## 2022-02-15 NOTE — BH DISCHARGE NOTE NURSING/SOCIAL WORK/PSYCH REHAB - NSDCCRNAME_GEN_ALL_CORE_FT
[facility] - Please note, you were enrolled on 2/14/22; your care coordinator should be reaching out to you, however you may also call Ms. Acevedo if you have any questions beforehand. Thank you.

## 2022-02-15 NOTE — BH DISCHARGE NOTE NURSING/SOCIAL WORK/PSYCH REHAB - PATIENT PORTAL LINK FT
You can access the FollowMyHealth Patient Portal offered by [facility] by registering at the following website: [email or web address]. By joining [employer]’s FollowMyHealth portal, you will also be able to view your health information using other applications (apps) compatible with our system.

## 2022-02-15 NOTE — BH INPATIENT PSYCHIATRY DISCHARGE NOTE - NSBHDCMEDICALFT_PSY_A_CORE
On admission, TSH was elevated at 4.56, but T3 and T4 were in normal range (T3-88, T4-6.15). BP was elevated during admission and Lisinopril was titrated. Melatonin was started for insomnia, which also improved. BP management and Thyroid hormone levels were discussed with internal medicine and advice was followed.

## 2022-02-15 NOTE — BH DISCHARGE NOTE NURSING/SOCIAL WORK/PSYCH REHAB - NSSCCONTNUM_GEN_ALL_CORE
[phone]  [email or web address] Cindy Rodriguez / [phone] ext. [phone]  [email or web address]

## 2022-02-15 NOTE — BH INPATIENT PSYCHIATRY DISCHARGE NOTE - NSDCCPCAREPLAN_GEN_ALL_CORE_FT
PRINCIPAL DISCHARGE DIAGNOSIS  Diagnosis: Psychosis  Assessment and Plan of Treatment: Medication management and psychotherapy
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
## 2022-02-15 NOTE — BH INPATIENT PSYCHIATRY PROGRESS NOTE - NSBHCHARTREVIEWVS_PSY_A_CORE FT
Vital Signs Last 24 Hrs  T(C): 36.1 (02-15-22 @ 11:14), Max: 36.6 (02-14-22 @ 17:53)  T(F): 97 (02-15-22 @ 11:14), Max: 97.9 (02-14-22 @ 17:53)  HR: 68 (02-15-22 @ 11:14) (68 - 68)  BP: 146/75 (02-15-22 @ 11:14) (146/75 - 146/75)  BP(mean): --  RR: --  SpO2: --    Orthostatic VS  02-14-22 @ 08:14  Lying BP: --/-- HR: --  Sitting BP: 151/76 HR: 62  Standing BP: 151/75 HR: 69  Site: --  Mode: --

## 2022-02-15 NOTE — BH INPATIENT PSYCHIATRY DISCHARGE NOTE - NSDCRECOMMEND_PSY_ALL_CORE
Unrestricted diet/activity Unrestricted diet/activity/Recommended medical follow-up following discharge...

## 2022-02-15 NOTE — BH DISCHARGE NOTE NURSING/SOCIAL WORK/PSYCH REHAB - NSSCNAMETXT_GEN_ALL_CORE
[facility] - Please note, you were approved on 2/15/22; a representative should be reaching out to you, however you may also call below number if you have any questions beforehand. Thank you.

## 2022-02-15 NOTE — BH DISCHARGE NOTE NURSING/SOCIAL WORK/PSYCH REHAB - NSCDUDCCRISIS_PSY_A_CORE
[facility]  1 (729) [facility] ([phone])  Text "WELL" to 65768  Website: www.[employer]/.[employer] 1 (485) 071-PQOG (3518) Website: www.safehorizon.org/.National Suicide Prevention Lifeline 2 (429) 621-5411/.  [employer]  1 (482) LIFENET (775-1524)/.  [facility]  [address] [place] [address] [phone]   Hours:  Monday through Friday from 9 AM to 3 PM/.  [employer] - [facility] Crisis Line  9 [phone], Option 1

## 2022-02-15 NOTE — BH INPATIENT PSYCHIATRY PROGRESS NOTE - NSBHMETABOLIC_PSY_ALL_CORE_FT
BMI: BMI (kg/m2): 28.4 (01-29-22 @ 08:46)  HbA1c:   Glucose: POCT Blood Glucose.: 143 mg/dL (01-28-22 @ 22:34)    BP: 146/75 (02-15-22 @ 11:14) (144/76 - 146/75)  Lipid Panel:

## 2022-02-15 NOTE — BH DISCHARGE NOTE NURSING/SOCIAL WORK/PSYCH REHAB - NSDCPRRECOMMEND_PSY_ALL_CORE
Psychiatric rehabilitation staff recommends that patient continue to demonstrate daily medication compliance and utilize identified coping skills to manage symptoms. Patient would benefit from attending outpatient treatment for ongoing medication management, support and psychotherapy.

## 2022-02-15 NOTE — BH INPATIENT PSYCHIATRY DISCHARGE NOTE - HOSPITAL COURSE
66F admitted with reported cognitive issues and psychosis.  Paranoid thoughts may be secondary to psychosis or suspected neurocognitive. Patient was seen individually for discharge assessment in an extensive interview today.  Patient was treated with Risperdal and showed a reduction in abnormal thought content. BP was elevated during admission and Lisinopril was titrated. Melatonin was started for insomnia, which also improved. Patient tolerated medication adjustments well with only reported side effects of headache and dizziness which improved during hospitalization. Pt will be discharged with Risperdal, Lisinopril, and Melatonin. On admission, TSH was elevated at 4.56, but T3 and T4 were in normal range (T3-88, T4-6.15). BP management and Thyroid hormone levels were discussed with internal medicine and advice was followed. Pt instructed to follow up with primary care physician for further management of BP and thyroid hormone level. Patient has made clinically meaningful progress over hospital course and clearly benefitted from unit structure, medications, and psychotherapeutic interventions. Patient understands reasons for recommendation to continue treatment with outpatient follow up.

## 2022-02-15 NOTE — BH DISCHARGE NOTE NURSING/SOCIAL WORK/PSYCH REHAB - NSDCCRNUMBER_GEN_ALL_CORE_FT
Abril Acevedo - Associate Patient Access Services Representative, [facility] [facility]reach and Enrollment  [address], [address]   [place] [address]   Cell: [phone]

## 2022-02-15 NOTE — BH DISCHARGE NOTE NURSING/SOCIAL WORK/PSYCH REHAB - NSDCPECAREGIVERED_PSY_ALL_CORE
Coronavirus/COVID19/Healthy Living/Influenza Vaccination/Safe and Effective Use of Medications/Relapse Prevention

## 2022-02-15 NOTE — BH INPATIENT PSYCHIATRY PROGRESS NOTE - MSE UNSTRUCTURED FT
Dressed appropriately.  Good hygiene and grooming.  Cooperative.  No psychomotor slowing or activation noted.  No abnormal movements noted.  Fairly well related, good eye contact. Speech slightly normal rate, accented, normal prosody.  Mood is "good" affect pleasant, stable, full range, appropriate to context. TP: fair associations.  TC: Reporting continued reduction in abnormal thought content.  No SIIP or HIIP.   Insight limited, judgment improved on interview.  Language fluent, gait intact.
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
## 2022-02-15 NOTE — BH INPATIENT PSYCHIATRY PROGRESS NOTE - NSBHASSESSSUMMFT_PSY_ALL_CORE
66F admitted with reported cognitive issues and psychosis.  Paranoid thoughts may be secondary to psychosis or suspected neurocognitive. Will continue Risperdal. Will continue BP management with Lisinopril.

## 2022-02-16 VITALS — SYSTOLIC BLOOD PRESSURE: 146 MMHG | DIASTOLIC BLOOD PRESSURE: 75 MMHG | TEMPERATURE: 97 F

## 2022-02-16 PROCEDURE — 99238 HOSP IP/OBS DSCHRG MGMT 30/<: CPT

## 2022-02-16 RX ADMIN — LISINOPRIL 20 MILLIGRAM(S): 2.5 TABLET ORAL at 08:45
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
## 2022-02-16 NOTE — BH INPATIENT PSYCHIATRY PROGRESS NOTE - NSTXCOPEDATETRGT_PSY_ALL_CORE
10-Feb-2022
17-Feb-2022
12-Feb-2022
06-Feb-2022
12-Feb-2022
06-Feb-2022
12-Feb-2022
17-Feb-2022
06-Feb-2022
17-Feb-2022
17-Feb-2022
16-Feb-2022
06-Feb-2022
06-Feb-2022

## 2022-02-16 NOTE — BH INPATIENT PSYCHIATRY PROGRESS NOTE - NSBHATTESTSEENBY_PSY_A_CORE
attending Psychiatrist without NP/Trainee
attending Psychiatrist without NP/Trainee
Attending Psychiatrist supervising NP/Trainee, meeting pt...
attending Psychiatrist without NP/Trainee
Attending Psychiatrist supervising NP/Trainee, meeting pt...

## 2022-02-16 NOTE — BH INPATIENT PSYCHIATRY PROGRESS NOTE - PRN MEDS
MEDICATIONS  (PRN):  acetaminophen     Tablet .. 650 milliGRAM(s) Oral every 6 hours PRN Mild Pain (1 - 3), Moderate Pain (4 - 6)  celecoxib 200 milliGRAM(s) Oral two times a day PRN Pain  haloperidol     Tablet 2.5 milliGRAM(s) Oral every 6 hours PRN agitation  haloperidol    Injectable 2.5 milliGRAM(s) IntraMuscular once PRN agitation  
MEDICATIONS  (PRN):  acetaminophen     Tablet .. 650 milliGRAM(s) Oral every 6 hours PRN Mild Pain (1 - 3), Moderate Pain (4 - 6)  celecoxib 200 milliGRAM(s) Oral two times a day PRN Pain  haloperidol     Tablet 2.5 milliGRAM(s) Oral every 6 hours PRN agitation  haloperidol    Injectable 2.5 milliGRAM(s) IntraMuscular once PRN agitation  
MEDICATIONS  (PRN):  acetaminophen     Tablet .. 650 milliGRAM(s) Oral every 6 hours PRN Mild Pain (1 - 3), Moderate Pain (4 - 6)  aluminum hydroxide/magnesium hydroxide/simethicone Suspension 30 milliLiter(s) Oral every 4 hours PRN Dyspepsia  celecoxib 200 milliGRAM(s) Oral two times a day PRN Pain  haloperidol     Tablet 2.5 milliGRAM(s) Oral every 6 hours PRN agitation  haloperidol    Injectable 2.5 milliGRAM(s) IntraMuscular once PRN agitation  
MEDICATIONS  (PRN):  acetaminophen     Tablet .. 650 milliGRAM(s) Oral every 6 hours PRN Mild Pain (1 - 3), Moderate Pain (4 - 6)  haloperidol     Tablet 2.5 milliGRAM(s) Oral every 6 hours PRN agitation  haloperidol    Injectable 2.5 milliGRAM(s) IntraMuscular once PRN agitation  
MEDICATIONS  (PRN):  acetaminophen     Tablet .. 650 milliGRAM(s) Oral every 6 hours PRN Mild Pain (1 - 3), Moderate Pain (4 - 6)  celecoxib 200 milliGRAM(s) Oral two times a day PRN Pain  haloperidol     Tablet 2.5 milliGRAM(s) Oral every 6 hours PRN agitation  haloperidol    Injectable 2.5 milliGRAM(s) IntraMuscular once PRN agitation  
MEDICATIONS  (PRN):  acetaminophen     Tablet .. 650 milliGRAM(s) Oral every 6 hours PRN Mild Pain (1 - 3), Moderate Pain (4 - 6)  aluminum hydroxide/magnesium hydroxide/simethicone Suspension 30 milliLiter(s) Oral every 4 hours PRN Dyspepsia  celecoxib 200 milliGRAM(s) Oral two times a day PRN Pain  haloperidol     Tablet 2.5 milliGRAM(s) Oral every 6 hours PRN agitation  haloperidol    Injectable 2.5 milliGRAM(s) IntraMuscular once PRN agitation  
MEDICATIONS  (PRN):  acetaminophen     Tablet .. 650 milliGRAM(s) Oral every 6 hours PRN Mild Pain (1 - 3), Moderate Pain (4 - 6)  aluminum hydroxide/magnesium hydroxide/simethicone Suspension 30 milliLiter(s) Oral every 4 hours PRN Dyspepsia  celecoxib 200 milliGRAM(s) Oral two times a day PRN Pain  haloperidol     Tablet 2.5 milliGRAM(s) Oral every 6 hours PRN agitation  haloperidol    Injectable 2.5 milliGRAM(s) IntraMuscular once PRN agitation  
MEDICATIONS  (PRN):  acetaminophen     Tablet .. 650 milliGRAM(s) Oral every 6 hours PRN Mild Pain (1 - 3), Moderate Pain (4 - 6)  celecoxib 200 milliGRAM(s) Oral two times a day PRN Pain  haloperidol     Tablet 2.5 milliGRAM(s) Oral every 6 hours PRN agitation  haloperidol    Injectable 2.5 milliGRAM(s) IntraMuscular once PRN agitation  
MEDICATIONS  (PRN):  acetaminophen     Tablet .. 650 milliGRAM(s) Oral every 6 hours PRN Mild Pain (1 - 3), Moderate Pain (4 - 6)  celecoxib 200 milliGRAM(s) Oral two times a day PRN Pain  haloperidol     Tablet 2.5 milliGRAM(s) Oral every 6 hours PRN agitation  haloperidol    Injectable 2.5 milliGRAM(s) IntraMuscular once PRN agitation  
MEDICATIONS  (PRN):  acetaminophen     Tablet .. 650 milliGRAM(s) Oral every 6 hours PRN Mild Pain (1 - 3), Moderate Pain (4 - 6)  aluminum hydroxide/magnesium hydroxide/simethicone Suspension 30 milliLiter(s) Oral every 4 hours PRN Dyspepsia  celecoxib 200 milliGRAM(s) Oral two times a day PRN Pain  haloperidol     Tablet 2.5 milliGRAM(s) Oral every 6 hours PRN agitation  haloperidol    Injectable 2.5 milliGRAM(s) IntraMuscular once PRN agitation  
MEDICATIONS  (PRN):  acetaminophen     Tablet .. 650 milliGRAM(s) Oral every 6 hours PRN Mild Pain (1 - 3), Moderate Pain (4 - 6)  celecoxib 200 milliGRAM(s) Oral two times a day PRN Pain  haloperidol     Tablet 2.5 milliGRAM(s) Oral every 6 hours PRN agitation  haloperidol    Injectable 2.5 milliGRAM(s) IntraMuscular once PRN agitation  
MEDICATIONS  (PRN):  acetaminophen     Tablet .. 650 milliGRAM(s) Oral every 6 hours PRN Mild Pain (1 - 3), Moderate Pain (4 - 6)  aluminum hydroxide/magnesium hydroxide/simethicone Suspension 30 milliLiter(s) Oral every 4 hours PRN Dyspepsia  celecoxib 200 milliGRAM(s) Oral two times a day PRN Pain  haloperidol     Tablet 2.5 milliGRAM(s) Oral every 6 hours PRN agitation  haloperidol    Injectable 2.5 milliGRAM(s) IntraMuscular once PRN agitation  
MEDICATIONS  (PRN):  acetaminophen     Tablet .. 650 milliGRAM(s) Oral every 6 hours PRN Mild Pain (1 - 3), Moderate Pain (4 - 6)  aluminum hydroxide/magnesium hydroxide/simethicone Suspension 30 milliLiter(s) Oral every 4 hours PRN Dyspepsia  celecoxib 200 milliGRAM(s) Oral two times a day PRN Pain  haloperidol     Tablet 2.5 milliGRAM(s) Oral every 6 hours PRN agitation  haloperidol    Injectable 2.5 milliGRAM(s) IntraMuscular once PRN agitation  
MEDICATIONS  (PRN):  acetaminophen     Tablet .. 650 milliGRAM(s) Oral every 6 hours PRN Mild Pain (1 - 3), Moderate Pain (4 - 6)  celecoxib 200 milliGRAM(s) Oral two times a day PRN Pain  haloperidol     Tablet 2.5 milliGRAM(s) Oral every 6 hours PRN agitation  haloperidol    Injectable 2.5 milliGRAM(s) IntraMuscular once PRN agitation

## 2022-02-16 NOTE — BH INPATIENT PSYCHIATRY PROGRESS NOTE - NSTXCOPEPROGRES_PSY_ALL_CORE
No Change
Improving
No Change
Improving
No Change
Improving
Improving
Met - goal discontinued
Improving
No Change
Improving
No Change

## 2022-02-16 NOTE — BH INPATIENT PSYCHIATRY PROGRESS NOTE - NSTXDCOPLKDATEEST_PSY_ALL_CORE
31-Jan-2022

## 2022-02-16 NOTE — BH INPATIENT PSYCHIATRY PROGRESS NOTE - NSTXCOPEGOAL_PSY_ALL_CORE
Identify and utilize 2 coping skills that meet their needs

## 2022-02-16 NOTE — BH INPATIENT PSYCHIATRY PROGRESS NOTE - NSTXANXGOAL_PSY_ALL_CORE
Be able to participate in activities despite lingering anxiety/panic

## 2022-02-16 NOTE — BH INPATIENT PSYCHIATRY PROGRESS NOTE - NSTXANXPROGRES_PSY_ALL_CORE
Improving
Met - goal discontinued
Improving

## 2022-02-16 NOTE — BH INPATIENT PSYCHIATRY PROGRESS NOTE - MSE OPTIONS
Unstructured MSE

## 2022-02-16 NOTE — BH INPATIENT PSYCHIATRY PROGRESS NOTE - NSBHCHARTREVIEWVS_PSY_A_CORE FT
Vital Signs Last 24 Hrs  T(C): 36.1 (02-16-22 @ 08:22), Max: 36.9 (02-15-22 @ 17:42)  T(F): 97 (02-16-22 @ 08:22), Max: 98.4 (02-15-22 @ 17:42)  HR: 68 (02-15-22 @ 11:14) (68 - 68)  BP: 146/75 (02-16-22 @ 08:22) (146/75 - 146/75)  BP(mean): 64 (02-16-22 @ 08:22) (64 - 64)  RR: --  SpO2: --     Vital Signs Last 24 Hrs  T(C): 36.1 (02-16-22 @ 08:22), Max: 36.9 (02-15-22 @ 17:42)  T(F): 97 (02-16-22 @ 08:22), Max: 98.4 (02-15-22 @ 17:42)  HR: --  BP: 146/75 (02-16-22 @ 08:22) (146/75 - 146/75)  BP(mean): 64 (02-16-22 @ 08:22) (64 - 64)  RR: --  SpO2: --

## 2022-02-16 NOTE — BH INPATIENT PSYCHIATRY PROGRESS NOTE - NSBHFUPINTERVALCCFT_PSY_A_CORE
Seen for paranoia/agitation
follow up agitation
Seen for paranoia/agitation
follow up agitation
Seen for paranoia/agitation

## 2022-02-16 NOTE — BH INPATIENT PSYCHIATRY PROGRESS NOTE - NSTXANXDATETRGT_PSY_ALL_CORE
17-Feb-2022
10-Feb-2022
16-Feb-2022
10-Feb-2022
17-Feb-2022
10-Feb-2022
10-Feb-2022
17-Feb-2022
10-Feb-2022
17-Feb-2022

## 2022-02-16 NOTE — BH INPATIENT PSYCHIATRY PROGRESS NOTE - NSBHINPTBILLING_PSY_ALL_CORE
32331 - Inpatient Low Complexity
27025 - Inpatient Low Complexity
60179 - Inpatient Low Complexity
70503 - Inpatient Low Complexity
29970 - Inpatient Low Complexity
82388 - Inpatient Moderate Complexity
30102 - Inpatient Low Complexity
67980 - Inpatient Low Complexity
34364 - Inpatient Low Complexity
62822 - Inpatient Moderate Complexity
68502 - Inpatient Moderate Complexity
06244 - Inpatient Low Complexity
07002 - Inpatient Moderate Complexity
17594 - Hospital Discharge Day Management; 30 min or less

## 2022-02-16 NOTE — BH INPATIENT PSYCHIATRY PROGRESS NOTE - NSTXDCOPLKDATETRGT_PSY_ALL_CORE
07-Feb-2022
14-Feb-2022
14-Feb-2022
07-Feb-2022
14-Feb-2022
14-Feb-2022
21-Feb-2022
14-Feb-2022
07-Feb-2022
16-Feb-2022
07-Feb-2022

## 2022-02-16 NOTE — BH INPATIENT PSYCHIATRY PROGRESS NOTE - NSTXPROBCOPE_PSY_ALL_CORE
COPING, INEFFECTIVE

## 2022-02-16 NOTE — BH INPATIENT PSYCHIATRY PROGRESS NOTE - NSTXANXDATEEST_PSY_ALL_CORE
10-Feb-2022
29-Jan-2022
29-Jan-2022
10-Feb-2022
29-Jan-2022
10-Feb-2022
29-Jan-2022
29-Jan-2022
10-Feb-2022
29-Jan-2022
10-Feb-2022

## 2022-02-16 NOTE — BH INPATIENT PSYCHIATRY PROGRESS NOTE - NSBHMETABOLIC_PSY_ALL_CORE_FT
BMI: BMI (kg/m2): 28.4 (01-29-22 @ 08:46)  HbA1c:   Glucose: POCT Blood Glucose.: 143 mg/dL (01-28-22 @ 22:34)    BP: 146/75 (02-16-22 @ 08:22) (146/75 - 146/75)  Lipid Panel:

## 2022-02-16 NOTE — BH INPATIENT PSYCHIATRY PROGRESS NOTE - NSTXANXINTERMD_PSY_ALL_CORE
Medication titration

## 2022-02-16 NOTE — BH INPATIENT PSYCHIATRY PROGRESS NOTE - MODIFICATIONS
Modifications were made to above trainee note where appropriate and/or are addressed below in case summary. 

## 2022-02-16 NOTE — BH INPATIENT PSYCHIATRY PROGRESS NOTE - NSTXDCOPLKPROGRES_PSY_ALL_CORE
Improving
No Change
Improving
No Change
Met - goal discontinued

## 2022-02-16 NOTE — BH INPATIENT PSYCHIATRY PROGRESS NOTE - MSE UNSTRUCTURED FT
Dressed appropriately.  Good hygiene and grooming.  Cooperative.  No psychomotor slowing or activation noted.  No abnormal movements noted.  Fairly well related, good eye contact. Speech normal rate, accented, normal prosody.  Mood is "good" affect euthymic, stable, full range, appropriate to context. TP: fair associations.  TC: Reporting continued reduction in abnormal thought content.  No SIIP or HIIP.   Insight limited, judgment improved on interview.  Language fluent, gait intact.
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
## 2022-02-16 NOTE — BH INPATIENT PSYCHIATRY PROGRESS NOTE - NSDCCRITERIA_PSY_ALL_CORE
When pt is no longer an acute or imminent risk of harm to self or others, and is able to care for self safely, pt may then be discharged. 

## 2022-02-16 NOTE — BH INPATIENT PSYCHIATRY PROGRESS NOTE - NSBHASSESSSUMMFT_PSY_ALL_CORE
Assessment: 66F admitted with reported cognitive issues and psychosis.  Paranoid thoughts may be secondary to psychosis or suspected neurocognitive.Patient was seen individually for discharge assessment in an extensive interview today.  Patient was treated with Risperdal and showed a reduction in abnormal thought content. BP was elevated during admission and Lisinopril was titrated. Melatonin was started for insomnia, which also improved. Patient tolerated medication adjustments well with only reported side effects of headache and dizziness which improved during hospitalization. Pt will be discharged with Risperdal, Lisinopril, and Melatonin. On admission, TSH was elevated at 4.56, but T3 and T4 were in normal range (T3-88, T4-6.15). BP management and Thyroid hormone levels were discussed with internal medicine and advice was followed. Pt instructed to follow up with primary care physician for further management of BP and thyroid hormone level. Patient has made clinically meaningful progress over hospital course and clearly benefitted from unit structure, medications, and psychotherapeutic interventions. Patient understands reasons for recommendation to continue treatment with outpatient follow up.     Plan: Patient consolidated gains of this hospitalization and can continue treatment as an outpatient. Patient was provided with extensive psychoeducation on treatment options and motivational counseling. Patient was instructed on actions for crisis situations, understood and agreed to follow instructions for handling crisis, including coming to ER or calling 911 should patient or her family/friends feel that she is in danger of hurting self or others. Patient also was given Suicide Prevention Lifeline number 1-437-439-1748 and provided with instructions on its use. Motivational counseling was provided to encourage compliance. Pt was given instructions for outpatient appointment.

## 2022-03-28 NOTE — ED BEHAVIORAL HEALTH ASSESSMENT NOTE - NSCURPASTPSYDX_PSY_ALL_CORE
History  Chief Complaint   Patient presents with    Chest Pain     pt was at [facility] and sent over for high bp/chest pain, onset of cp around noon today     70-year-old female, was sent in by her primary care doctor for her elevated blood pressure  Patient notes that she was seen yesterday at [facility] for chest pain, shortness of breath, after workup was sent home to follow  Patient is still having intermittent chest pain, shortness of breath, nausea, dizziness described as lightheadedness, patient denies any fever, cough, chest pain is pressure-like pain and does not radiate, patient has had no vomiting, constipation, diarrhea, dysuria, hematuria, abdominal pain, distention, and no other complaints  Prior to Admission Medications   Prescriptions Last Dose Informant Patient Reported? Taking?    ARIPiprazole (ABILIFY) 5 mg tablet  Self No No   Sig: TAKE 1 TABLET(5 MG) BY MOUTH DAILY   DULoxetine (CYMBALTA) 30 mg delayed release capsule  Self No No   Sig: Take 1 capsule (30 mg total) by mouth daily   Microlet Lancets MISC  Self Yes No   benztropine (COGENTIN) 1 mg tablet  Self Yes No   Sig: Take 1 mg by mouth daily   buPROPion (WELLBUTRIN SR) 150 mg 12 hr tablet  Self Yes No   Si mg daily     cloNIDine (CATAPRES) 0 1 mg tablet  Self No No   Sig: Take 1 tablet (0 1 mg total) by mouth daily at bedtime   clonazePAM (KlonoPIN) 0 5 mg tablet  Self No No   Sig: TAKE 1 TABLET(0 5 MG) BY MOUTH TWICE DAILY AS NEEDED FOR SEIZURES   ergocalciferol (VITAMIN D2) 50,000 units  Self No No   Sig: TAKE 1 CAPSULE BY MOUTH 1 TIME A WEEK   folic acid (FOLVITE) 1 mg tablet  Self Yes No   Sig: Take by mouth daily     gabapentin (NEURONTIN) 400 mg capsule  Self No No   Sig: Take 2 capsules (800 mg total) by mouth 3 (three) times a day   Patient not taking: Reported on 2022    gabapentin (NEURONTIN) 800 mg tablet  Self No No   Sig: TAKE 1 TABLET(800 MG) BY MOUTH THREE TIMES DAILY   glucose blood test strip  Self No No Sig: Check sugars 5 times a week   hydrOXYzine HCL (ATARAX) 50 mg tablet  Self No No   Sig: Take 1 tablet (50 mg total) by mouth 2 (two) times a day as needed for anxiety   insulin glargine (Lantus SoloStar) 100 units/mL injection pen  Self Yes No   Sig: Inject 14 Units under the skin daily     levothyroxine 25 mcg tablet  Self No No   Sig: Take 1 tablet (25 mcg total) by mouth daily in the early morning   lisinopril-hydrochlorothiazide (PRINZIDE,ZESTORETIC) 10-12 5 MG per tablet  Self No No   Sig: TAKE 1 TABLET BY MOUTH DAILY   magnesium oxide (MAG-OX) 400 mg  Self No No   Sig: Take 1 tablet (400 mg total) by mouth daily for 14 days   metFORMIN (GLUCOPHAGE) 1000 MG tablet  Self No No   Sig: TAKE 1 TABLET(1000 MG) BY MOUTH TWICE DAILY WITH MEALS   traZODone (DESYREL) 100 mg tablet  Self No No   Sig: Take 2 tablets (200 mg total) by mouth daily at bedtime      Facility-Administered Medications: None       Past Medical History:   Diagnosis Date    Addiction to drug ([place] [address] )     Adjustment disorder     Alcohol abuse     Alcoholism ([place] [address] )     Anxiety     Bipolar disorder (HCC)     Bowel obstruction (HCC)     Depression     Diabetes type 2, controlled ([place] [address] )     Disease of thyroid gland     Gastritis     Head injury     Heart palpitations     Hyperlipidemia     Hypertension     Hypothyroidism     Psychiatric illness     PTSD (post-traumatic stress disorder)     [facility]     [facility]izures [facility]     Sleep difficulties     Substance abuse ([place] [address] )     Suicide attempt ([place] [address] )     Withdrawal symptoms, drug or narcotic ([place] [address] )        Past Surgical History:   Procedure Laterality Date    ABDOMINAL SURGERY      APPENDECTOMY      BOWEL RESECTION      CHOLECYSTECTOMY      ESOPHAGOGASTRODUODENOSCOPY N/A 5/18/2018    Procedure: ESOPHAGOGASTRODUODENOSCOPY (EGD) with bx;  Surgeon: Chula Calhoun DO;  Location: [facility];   Service: Gastroenterology    HYSTERECTOMY      KNEE SURGERY Bilateral        Family History Problem Relation Age of Onset    Diabetes Father     Bipolar disorder Mother      I have reviewed and agree with the history as documented  E-Cigarette/Vaping    E-Cigarette Use Never User      E-Cigarette/Vaping Substances    Nicotine Yes     THC No     CBD No     Flavoring No     Other No     Unknown No      Social History     Tobacco Use    Smoking status: Current Every Day Smoker     Packs/day: 0 50     Years: 25 00     Pack years: 12 50     Types: Cigarettes    Smokeless tobacco: Never Used   Vaping Use    Vaping Use: Never used   Substance Use Topics    Alcohol use: Not Currently     Alcohol/week: 6 0 standard drinks     Types: 6 Cans of beer per week     Comment: last drink on 08/15/20    Drug use: No       Review of Systems   Constitutional: Negative for fever  HENT: Negative for congestion  Eyes: Negative for visual disturbance  Respiratory: Positive for shortness of breath  Negative for cough  Cardiovascular: Positive for chest pain  Gastrointestinal: Positive for nausea  Negative for abdominal pain, constipation, diarrhea and vomiting  Endocrine: Negative for polyuria  Genitourinary: Negative for dysuria and hematuria  Musculoskeletal: Negative for myalgias  Neurological: Positive for light-headedness  Negative for headaches  Physical Exam  Physical Exam  Vitals and nursing note reviewed  Constitutional:       Appearance: Normal appearance  HENT:      Head: Normocephalic and atraumatic  Right Ear: External ear normal       Left Ear: External ear normal       Mouth/Throat:      Mouth: Mucous membranes are moist       Pharynx: Oropharynx is clear  Eyes:      Extraocular Movements: Extraocular movements intact  Conjunctiva/sclera: Conjunctivae normal    Cardiovascular:      Rate and Rhythm: Normal rate and regular rhythm  Heart sounds: Normal heart sounds     Pulmonary:      Effort: Pulmonary effort is normal       Breath sounds: Normal breath sounds  Abdominal:      General: Abdomen is flat  There is no distension  Palpations: Abdomen is soft  Musculoskeletal:         General: No deformity  Normal range of motion  Cervical back: Normal range of motion  Skin:     General: Skin is warm and dry  Neurological:      General: No focal deficit present  Mental Status: She is alert  Gait: Gait normal    Psychiatric:         Mood and Affect: Mood normal          Behavior: Behavior normal          Vital Signs  ED Triage Vitals [03/28/22 1514]   Temperature Pulse Respirations Blood Pressure SpO2   98 6 °F (37 °C) (!) 109 20 (!) 182/94 97 %      Temp Source Heart Rate Source Patient Position - Orthostatic VS BP Location FiO2 (%)   Oral Monitor Sitting Left arm --      Pain Score       --           Vitals:    03/28/22 1514 03/28/22 1815 03/28/22 1900 03/28/22 2145   BP: (!) 182/94 138/82 140/78 123/57   Pulse: (!) 109 92 100 98   Patient Position - Orthostatic VS: Sitting Lying Lying Lying         Visual Acuity      ED Medications  Medications   iohexol (OMNIPAQUE) 350 MG/ML injection (SINGLE-DOSE) 100 mL (85 mL Intravenous Given 3/28/22 2126)       Diagnostic Studies  Results Reviewed     Procedure Component Value Units Date/Time    D-dimer, quantitative [[id number]]  (Abnormal) Collected: 03/28/22 1519    Lab Status: Final result Specimen: Blood Updated: 03/28/22 1956     D-Dimer, Quant 0 83 ug/ml FEU     Narrative: In the evaluation for possible pulmonary embolism, in the appropriate (Well's Score of 4 or less) patient, the age adjusted d-dimer cutoff for this patient can be calculated as:    Age x 0 01 (in ug/mL) for Age-adjusted D-dimer exclusion threshold for a patient over 50 years      HS Troponin I 2hr [[id number]]  (Normal) Collected: 03/28/22 1817    Lab Status: Final result Specimen: Blood from Arm, Right Updated: 03/28/22 1900     hs TnI 2hr 2 ng/L      Delta 2hr hsTnI 0 ng/L     NT-BNP PRO [[id number]]  (Normal) Collected: 03/28/22 1519    Lab Status: Final result Specimen: Blood from Arm, Left Updated: 03/28/22 1759     NT-proBNP 46 pg/mL     Comprehensive metabolic panel [[id number]]  (Abnormal) Collected: 03/28/22 1519    Lab Status: Final result Specimen: Blood from Arm, Left Updated: 03/28/22 1559     Sodium 135 mmol/L      Potassium 3 8 mmol/L      Chloride 98 mmol/L      CO2 24 mmol/L      ANION GAP 13 mmol/L      BUN 11 mg/dL      Creatinine 0 80 mg/dL      Glucose 339 mg/dL      Calcium 9 4 mg/dL      AST 23 U/L      ALT 48 U/L      Alkaline Phosphatase 109 U/L      Total Protein 7 8 g/dL      Albumin 3 8 g/dL      Total Bilirubin 0 45 mg/dL      eGFR 83 ml/min/1 73sq m     Narrative:      National Kidney Disease Foundation guidelines for Chronic Kidney Disease (CKD):     Stage 1 with normal or high GFR (GFR > 90 mL/min/1 73 square meters)    Stage 2 Mild CKD (GFR = 60-89 mL/min/1 73 square meters)    Stage 3A Moderate CKD (GFR = 45-59 mL/min/1 73 square meters)    Stage 3B Moderate CKD (GFR = 30-44 mL/min/1 73 square meters)    Stage 4 Severe CKD (GFR = 15-29 mL/min/1 73 square meters)    Stage 5 End Stage CKD (GFR <15 mL/min/1 73 square meters)  Note: GFR calculation is accurate only with a steady state creatinine    HS Troponin 0hr (reflex protocol) [[id number]]  (Normal) Collected: 03/28/22 1519    Lab Status: Final result Specimen: Blood from Arm, Left Updated: 03/28/22 1557     hs TnI 0hr 2 ng/L     CBC and differential [[id number]]  (Abnormal) Collected: 03/28/22 1519    Lab Status: Final result Specimen: Blood from Arm, Left Updated: 03/28/22 1530     WBC 12 29 Thousand/uL      RBC 4 79 Million/uL      Hemoglobin 14 3 g/dL      Hematocrit 43 3 %      MCV 90 fL      MCH 29 9 pg      MCHC 33 0 g/dL      RDW 14 0 %      MPV 9 9 fL      Platelets 628 Thousands/uL      nRBC 0 /100 WBCs      Neutrophils Relative 63 %      Immat GRANS % 1 %      Lymphocytes Relative 28 %      Monocytes Relative 7 %      Eosinophils Relative 1 %      Basophils Relative 0 %      Neutrophils Absolute 7 71 Thousands/µL      Immature Grans Absolute 0 09 Thousand/uL      Lymphocytes Absolute 3 46 Thousands/µL      Monocytes Absolute 0 91 Thousand/µL      Eosinophils Absolute 0 08 Thousand/µL      Basophils Absolute 0 04 Thousands/µL                  CTA ED chest PE Study   Final Result by Cecille Marlow MD (03/28 2214)      No evidence of pulmonary embolus  No evidence of acute intrathoracic pathology  Workstation performed: [id number]                    Procedures  ECG 12 Lead Documentation Only    Date/Time: 3/28/2022 7:22 PM  Performed by: Luis Antonio Shaffer MD  Authorized by: Luis Antonio Shaffer MD     ECG reviewed by me, the ED Provider: yes    Patient location:  ED  Previous ECG:     Previous ECG:  Unavailable  Interpretation:     Interpretation: normal    Rate:     ECG rate:  107    ECG rate assessment: tachycardic    Rhythm:     Rhythm: sinus rhythm    Ectopy:     Ectopy: none    QRS:     QRS axis:  Normal  Conduction:     Conduction: normal    ST segments:     ST segments:  Normal  T waves:     T waves: normal               ED Course       MDM  Number of Diagnoses or Management Options  Diagnosis management comments: Patient's chest pain workup is unremarkable, heart score is 4, patient is safe for discharge home with close follow-up with cardiology as an outpatient with return indications and follow-up instructions given  Disposition  Final diagnoses:   Acute chest pain     Time reflects when diagnosis was documented in both MDM as applicable and the Disposition within this note     Time User Action Codes Description Comment    3/28/2022  7:12 PM Juan Carranza Add [R07 9] Acute chest pain       ED Disposition     ED Disposition Condition Date/Time Comment    Discharge Stable Mon Mar 28, 2022  7:12 PM Matilde Daniels discharge to home/self care              Follow-up Information     Follow up With Specialties Details Why Contact Info Additional Information    Argentina Adkins MD Internal Medicine Schedule an appointment as soon as possible for a visit in 3 days For follow-up [address] [address]  [address] [facility] Emergency Department Emergency Medicine Go to  As needed [address] [facility] [address] [address] [facility], [address], [place], [place], [address] Cardiology Schedule an appointment as soon as possible for a visit in 3 days For follow-up [address] [facility] [id number]  [address] Cardiology [place], [address] [facility], [place], [place], [address]          Discharge Medication List as of 3/28/2022 11:18 PM      CONTINUE these medications which have NOT CHANGED    Details   ARIPiprazole (ABILIFY) 5 mg tablet TAKE 1 TABLET(5 MG) BY MOUTH DAILY, Normal      benztropine (COGENTIN) 1 mg tablet Take 1 mg by mouth daily, Starting Fri 7/23/2021, Historical Med      buPROPion [facility] SR) 150 mg 12 hr tablet Starting Sun 11/14/2021, Historical Med      clonazePAM (KlonoPIN) 0 5 mg tablet TAKE 1 TABLET(0 5 MG) BY MOUTH TWICE DAILY AS NEEDED FOR SEIZURES, Normal      cloNIDine (CATAPRES) 0 1 mg tablet Take 1 tablet (0 1 mg total) by mouth daily at bedtime, Starting Mon 10/12/2020, Print      DULoxetine (CYMBALTA) 30 mg delayed release capsule Take 1 capsule (30 mg total) by mouth daily, Starting Mon 1/3/2022, Normal      ergocalciferol (VITAMIN D2) 50,000 units TAKE 1 CAPSULE BY MOUTH 1 TIME A WEEK, Normal      folic acid (FOLVITE) 1 mg tablet Take by mouth daily, Historical Med      gabapentin (NEURONTIN) 400 mg capsule Take 2 capsules (800 mg total) by mouth 3 (three) times a day, Starting Wed 3/31/2021, Normal      gabapentin (NEURONTIN) 800 mg tablet TAKE 1 TABLET(800 MG) BY MOUTH THREE TIMES DAILY, Normal      glucose blood test strip Check sugars 5 times a week, Normal      hydrOXYzine HCL (ATARAX) 50 mg tablet Take 1 tablet (50 mg total) by mouth 2 (two) times a day as needed for anxiety, Starting Wed 3/31/2021, Normal      insulin glargine (Lantus SoloStar) 100 units/mL injection pen Inject 14 Units under the skin daily, Starting Fri 7/2/2021, Historical Med      levothyroxine 25 mcg tablet Take 1 tablet (25 mcg total) by mouth daily in the early morning, Starting Tue 10/13/2020, Print      lisinopril-hydrochlorothiazide (PRINZIDE,ZESTORETIC) 10-12 5 MG per tablet TAKE 1 TABLET BY MOUTH DAILY, Starting Thu 12/9/2021, Normal      magnesium oxide (MAG-OX) 400 mg Take 1 tablet (400 mg total) by mouth daily for 14 days, Starting Sun 3/27/2022, Until Sun 4/10/2022, Normal      metFORMIN (GLUCOPHAGE) 1000 MG tablet TAKE 1 TABLET(1000 MG) BY MOUTH TWICE DAILY WITH MEALS, Normal      Microlet Lancets MISC Starting Mon 1/3/2022, Historical Med      traZODone (DESYREL) 100 mg tablet Take 2 tablets (200 mg total) by mouth daily at bedtime, Starting Mon 1/3/2022, Normal      amLODIPine (NORVASC) 5 mg tablet Take 1 tablet (5 mg total) by mouth daily, Starting Sat 9/4/2021, Normal      !! atorvastatin (LIPITOR) 10 mg tablet Take 1 tablet (10 mg total) by mouth daily with dinner, Starting Fri 9/3/2021, Normal      !! atorvastatin (LIPITOR) 40 mg tablet Starting Sat 10/16/2021, Historical Med      insulin isophane (HumuLIN N KwikPen) 100 units/mL injection pen Inject 15 Units under the skin 2 (two) times a day before meals, Starting Thu 1/6/2022, Normal      metoprolol tartrate (LOPRESSOR) 50 mg tablet Take 1 tablet (50 mg total) by mouth every 12 (twelve) hours, Starting Wed 3/31/2021, Normal       !! - Potential duplicate medications found  Please discuss with provider                PDMP Review       Value Time User    PDMP Reviewed  Yes 1/6/2022  3:15 PM Jaxson Lindsey Jacqui Morgan MD          ED Provider  Electronically Signed by           Sanam Alex MD  04/08/22 0335 Psychotic disorder

## 2022-05-31 NOTE — BH INPATIENT PSYCHIATRY PROGRESS NOTE - MSE UNSTRUCTURED FT
Dressed appropriately.  Good hygiene and grooming.  Cooperative.  No psychomotor slowing or activation noted.  No abnormal movements noted.  Fairly well related, good eye contact. Speech regular rate, accented, normal prosody.  Mood is "good" affect euthymic, stable, congruent.  Less overinclusive TP today, fair associations.  TC: As per subjective, however this thought content appears to improve.  No SIIP or HIIP.   Insight limited, judgment improved on interview.  Language fluent, gait intact.      Information could not be obtained

## 2022-09-19 ENCOUNTER — NON-APPOINTMENT (OUTPATIENT)
Age: 67
End: 2022-09-19

## 2022-09-19 ENCOUNTER — APPOINTMENT (OUTPATIENT)
Dept: ELECTROPHYSIOLOGY | Facility: CLINIC | Age: 67
End: 2022-09-19

## 2022-09-19 VITALS
HEIGHT: 61 IN | WEIGHT: 160 LBS | BODY MASS INDEX: 30.21 KG/M2 | OXYGEN SATURATION: 96 % | DIASTOLIC BLOOD PRESSURE: 77 MMHG | HEART RATE: 64 BPM | SYSTOLIC BLOOD PRESSURE: 125 MMHG

## 2022-09-19 DIAGNOSIS — R53.83 OTHER MALAISE: ICD-10-CM

## 2022-09-19 DIAGNOSIS — R53.81 OTHER MALAISE: ICD-10-CM

## 2022-09-19 DIAGNOSIS — I10 ESSENTIAL (PRIMARY) HYPERTENSION: ICD-10-CM

## 2022-09-19 DIAGNOSIS — E78.5 HYPERLIPIDEMIA, UNSPECIFIED: ICD-10-CM

## 2022-09-19 PROCEDURE — 99205 OFFICE O/P NEW HI 60 MIN: CPT

## 2022-09-19 PROCEDURE — 93000 ELECTROCARDIOGRAM COMPLETE: CPT

## 2022-09-19 RX ORDER — CALCIUM CARBONATE/VITAMIN D3 600 MG-10
600-400 TABLET ORAL
Qty: 60 | Refills: 0 | Status: DISCONTINUED | COMMUNITY
Start: 2022-04-18

## 2022-09-19 RX ORDER — AMLODIPINE BESYLATE AND BENAZEPRIL HYDROCHLORIDE 2.5; 1 MG/1; MG/1
2.5-1 CAPSULE ORAL
Refills: 0 | Status: DISCONTINUED | COMMUNITY
End: 2022-09-19

## 2022-09-19 RX ORDER — CHLORHEXIDINE GLUCONATE 4 %
1000 LIQUID (ML) TOPICAL
Qty: 90 | Refills: 0 | Status: ACTIVE | COMMUNITY
Start: 2021-06-23

## 2022-09-19 RX ORDER — RISPERIDONE 2 MG/1
2 TABLET, FILM COATED ORAL
Qty: 30 | Refills: 0 | Status: DISCONTINUED | COMMUNITY
Start: 2022-07-12

## 2022-09-19 RX ORDER — RISPERIDONE 0.5 MG/1
0.5 TABLET, FILM COATED ORAL
Qty: 30 | Refills: 0 | Status: DISCONTINUED | COMMUNITY
Start: 2022-08-16

## 2022-09-19 RX ORDER — ERGOCALCIFEROL 1.25 MG/1
1.25 MG CAPSULE, LIQUID FILLED ORAL
Qty: 4 | Refills: 0 | Status: ACTIVE | COMMUNITY
Start: 2022-04-18

## 2022-09-19 RX ORDER — RISPERIDONE 1 MG/1
1 TABLET, FILM COATED ORAL
Qty: 30 | Refills: 0 | Status: ACTIVE | COMMUNITY
Start: 2022-09-10

## 2022-09-19 RX ORDER — MELOXICAM 15 MG/1
15 TABLET ORAL
Refills: 0 | Status: DISCONTINUED | COMMUNITY
End: 2022-09-19

## 2022-09-19 RX ORDER — TIZANIDINE HYDROCHLORIDE 2 MG/1
2 CAPSULE ORAL
Refills: 0 | Status: DISCONTINUED | COMMUNITY
End: 2022-09-19

## 2022-09-19 RX ORDER — AMLODIPINE BESYLATE 2.5 MG/1
2.5 TABLET ORAL
Qty: 90 | Refills: 0 | Status: ACTIVE | COMMUNITY
Start: 2022-07-14

## 2022-09-19 NOTE — DISCUSSION/SUMMARY
[FreeTextEntry1] : Impression:\par \par 1. Fatigue: EKG performed today to assess for presence of conduction disease and reveals NSR. Unsure if symptoms are secondary to sinus node dysfunction. Recommend 30 day CardioNet to assess for presence of bradyarrhythmias associated with symptoms. \par \par 2. HTN: resume oral antihypertensives as prescribed. Encouraged heart healthy diet, sodium restriction, and weight loss. Continue regular f/u with Cardiologist for further HTN management.\par \par 3. HLD: resume statin therapy as prescribed and regular f/u with Cardiologist for routine lipid monitoring and management.\par \par Plan for 30 day CardioNet.  [EKG obtained to assist in diagnosis and management of assessed problem(s)] : EKG obtained to assist in diagnosis and management of assessed problem(s)

## 2022-09-19 NOTE — HISTORY OF PRESENT ILLNESS
[FreeTextEntry1] : Jimena Granados is a 67y/o woman with Hx of HTN, HLD, and fatigue/dizziness who presents today for initial evaluation. Has been struggling with fatigue and occasional feeling of dizziness/lightheaded. Has been ongoing for the past 6 months. Saw PCP and was told her HR was slow and concern it could be causing symptoms. No recent cardiac work up. Denies chest pain, palpitations, SOB, syncope or near syncope.\par

## 2022-11-23 NOTE — ED BEHAVIORAL HEALTH ASSESSMENT NOTE - AFFECT RANGE

## 2022-12-23 NOTE — ED BEHAVIORAL HEALTH ASSESSMENT NOTE - CURRENT MEDICATION
Problem: Discharge Planning  Goal: Discharge to home or other facility with appropriate resources  Outcome: Progressing     Problem: Pain  Goal: Verbalizes/displays adequate comfort level or baseline comfort level  Outcome: Progressing     Problem: Safety - Adult  Goal: Free from fall injury  Outcome: Progressing     Problem: Nutrition Deficit:  Goal: Optimize nutritional status  Outcome: Progressing     Problem: Chronic Conditions and Co-morbidities  Goal: Patient's chronic conditions and co-morbidity symptoms are monitored and maintained or improved  Outcome: Progressing denied

## 2023-01-23 NOTE — BH SOCIAL WORK INITIAL PSYCHOSOCIAL EVALUATION - NSPROPTADDSUPPORTNAMEFT1_GEN_A_NUR
Interval H&P  See note from Dr Alexandria Ybarra dated 1/10/23  No interval changes  36M with nasal obstruction  Proceed with turbinate resection. Brandon Granados, Daughter

## 2024-01-01 NOTE — BH INPATIENT PSYCHIATRY PROGRESS NOTE - NSTXCOPEDATEEST_PSY_ALL_CORE
10-Feb-2022
30-Jan-2022
10-Feb-2022
10-Feb-2022
30-Jan-2022
10-Feb-2022
30-Jan-2022
PAST SURGICAL HISTORY:  H/O knee surgery right meniscus repair 2014    H/O myomectomy 4/2009    History of breast abscess surgery, right 1984    S/P tubal ligation 2004